# Patient Record
Sex: FEMALE | Race: WHITE | NOT HISPANIC OR LATINO | Employment: PART TIME | URBAN - METROPOLITAN AREA
[De-identification: names, ages, dates, MRNs, and addresses within clinical notes are randomized per-mention and may not be internally consistent; named-entity substitution may affect disease eponyms.]

---

## 2024-09-24 ENCOUNTER — HOSPITAL ENCOUNTER (OUTPATIENT)
Dept: RADIOLOGY | Facility: CLINIC | Age: 69
Discharge: HOME | End: 2024-09-24
Payer: COMMERCIAL

## 2024-09-24 DIAGNOSIS — Z87.891 PERSONAL HISTORY OF NICOTINE DEPENDENCE: ICD-10-CM

## 2024-09-24 DIAGNOSIS — Z12.2 ENCOUNTER FOR SCREENING FOR MALIGNANT NEOPLASM OF RESPIRATORY ORGANS: ICD-10-CM

## 2024-09-24 PROCEDURE — 71271 CT THORAX LUNG CANCER SCR C-: CPT | Performed by: RADIOLOGY

## 2024-09-24 PROCEDURE — 71271 CT THORAX LUNG CANCER SCR C-: CPT

## 2024-10-08 ENCOUNTER — HOSPITAL ENCOUNTER (OUTPATIENT)
Dept: RADIOLOGY | Facility: HOSPITAL | Age: 69
Discharge: HOME | End: 2024-10-08
Payer: COMMERCIAL

## 2024-10-08 ENCOUNTER — HOSPITAL ENCOUNTER (OUTPATIENT)
Dept: RADIOLOGY | Facility: CLINIC | Age: 69
Discharge: HOME | End: 2024-10-08
Payer: COMMERCIAL

## 2024-10-08 ENCOUNTER — HOSPITAL ENCOUNTER (OUTPATIENT)
Dept: RADIOLOGY | Facility: CLINIC | Age: 69
End: 2024-10-08
Payer: COMMERCIAL

## 2024-10-08 VITALS — BODY MASS INDEX: 40.48 KG/M2 | HEIGHT: 62 IN | WEIGHT: 220 LBS

## 2024-10-08 DIAGNOSIS — R92.8 ABNORMAL FINDINGS ON DIAGNOSTIC IMAGING OF BREAST: Primary | ICD-10-CM

## 2024-10-08 DIAGNOSIS — N63.10 UNSPECIFIED LUMP IN THE RIGHT BREAST, UNSPECIFIED QUADRANT: ICD-10-CM

## 2024-10-08 DIAGNOSIS — N63.15 UNSPECIFIED LUMP IN THE RIGHT BREAST, OVERLAPPING QUADRANTS: ICD-10-CM

## 2024-10-08 DIAGNOSIS — N63.10 BREAST MASS, RIGHT: ICD-10-CM

## 2024-10-08 DIAGNOSIS — Z78.0 ASYMPTOMATIC MENOPAUSAL STATE: ICD-10-CM

## 2024-10-08 PROCEDURE — 77080 DXA BONE DENSITY AXIAL: CPT

## 2024-10-08 PROCEDURE — 76642 ULTRASOUND BREAST LIMITED: CPT | Performed by: RADIOLOGY

## 2024-10-08 PROCEDURE — 76642 ULTRASOUND BREAST LIMITED: CPT | Mod: RT

## 2024-10-08 PROCEDURE — 77066 DX MAMMO INCL CAD BI: CPT

## 2024-10-08 PROCEDURE — 76982 USE 1ST TARGET LESION: CPT | Mod: RT

## 2024-10-08 PROCEDURE — 77066 DX MAMMO INCL CAD BI: CPT | Performed by: RADIOLOGY

## 2024-10-08 PROCEDURE — G0279 TOMOSYNTHESIS, MAMMO: HCPCS | Performed by: RADIOLOGY

## 2024-10-08 NOTE — NURSING NOTE
After patient review of diagnostic results with Dr. De, support provided. Written literature regarding abnormal breast imaging and breast biopsy including what to expect before, during, and after the procedure reviewed with the patient. All questions answered. Patient has a consultation with Dr. Felix for 10/16, care coordinated with Dr. Felix's office to move consultation to 10/15 10:45AM to allow for biopsy visit 10/16 11:30AM. Information provided and reviewed to include provider information, directions, and how to reach me directly with questions or concerns before concluding visit.

## 2024-10-08 NOTE — PROGRESS NOTES
Patient follow up scheduling:  right breast ultrasound biopsy per provider recommendation, 10/16 11:30AM per patient scheduling preference (patient was agreeable to  adjust consultation visit to [facility] office for 10/15, though patient had already planned for apt 10/16 and able to accommodate her request for biopsy visit at the original time).

## 2024-10-15 ENCOUNTER — APPOINTMENT (OUTPATIENT)
Facility: CLINIC | Age: 69
End: 2024-10-15
Payer: COMMERCIAL

## 2024-10-15 VITALS
HEIGHT: 62 IN | SYSTOLIC BLOOD PRESSURE: 148 MMHG | DIASTOLIC BLOOD PRESSURE: 104 MMHG | BODY MASS INDEX: 41.7 KG/M2 | WEIGHT: 226.6 LBS | OXYGEN SATURATION: 97 % | HEART RATE: 69 BPM

## 2024-10-15 DIAGNOSIS — N63.11 MASS OF UPPER OUTER QUADRANT OF RIGHT BREAST: Primary | ICD-10-CM

## 2024-10-15 PROCEDURE — 1159F MED LIST DOCD IN RCRD: CPT | Performed by: SURGERY

## 2024-10-15 PROCEDURE — 1160F RVW MEDS BY RX/DR IN RCRD: CPT | Performed by: SURGERY

## 2024-10-15 PROCEDURE — 99203 OFFICE O/P NEW LOW 30 MIN: CPT | Performed by: SURGERY

## 2024-10-15 RX ORDER — BUPROPION HYDROCHLORIDE 150 MG/1
1 TABLET ORAL EVERY MORNING
COMMUNITY
Start: 2024-09-03

## 2024-10-15 RX ORDER — MELOXICAM 7.5 MG/1
7.5 TABLET ORAL
COMMUNITY
Start: 2022-11-04

## 2024-10-15 RX ORDER — ATORVASTATIN CALCIUM 20 MG/1
1 TABLET, FILM COATED ORAL DAILY
COMMUNITY
Start: 2022-11-04

## 2024-10-15 ASSESSMENT — ENCOUNTER SYMPTOMS
DIARRHEA: 0
FATIGUE: 0
CONSTIPATION: 0
FLANK PAIN: 0
VOMITING: 0
EYE PAIN: 0
HEMATURIA: 0
HEADACHES: 0
POLYPHAGIA: 0
CONFUSION: 0
ABDOMINAL PAIN: 0
WOUND: 0
CHILLS: 0
BRUISES/BLEEDS EASILY: 0
SPEECH DIFFICULTY: 0
SHORTNESS OF BREATH: 0
DYSURIA: 0
WEAKNESS: 0
ARTHRALGIAS: 1
EYE REDNESS: 0
FEVER: 0
COUGH: 1
FREQUENCY: 0
AGITATION: 0
MYALGIAS: 0
NAUSEA: 0

## 2024-10-15 NOTE — PATIENT INSTRUCTIONS
1.  Keep your appointment for your ultrasound-guided biopsy of your right breast mass on Wednesday, 10/16/2024.  This biopsy is performed in the radiology department of [facility].  2.  If you have any problems with the biopsy site (bleeding or concern for infection), please contact Dr. Felix's office.  [phone]  3.  Follow-up in Dr. Felix's office on Wednesday, 10/23/2024 with a virtual appointment to review your biopsy results.

## 2024-10-15 NOTE — LETTER
"October 15, 2024     Aurora Delgadillo MD  [address]  [address]  [facility] [address]    Patient: Vira Gordon   YOB: 1955   Date of Visit: 10/15/2024       Dear Dr. Aurora Delgadillo MD:    Thank you for referring Vira Gordon to me for evaluation. Below are my notes for this consultation.  If you have questions, please do not hesitate to call me. I look forward to following your patient along with you.       Sincerely,     Richa Felix MD      CC: No Recipients  ______________________________________________________________________________________        GENERAL SURGERY OFFICE NOTE    Patient: Vira Gordon    Age: 69 y.o.   Gender: female    MRN: [id number]    PCP: Aurora Delgadillo MD        SUBJECTIVE     Chief Complaint  New Patient Visit (Patient was referred by Dr. Delgadillo for abnormal right breast mammogram. Patient states that she has an indent on her right breast that she noticed about 1 month ago. Patient states that the only time she feels pain to the breast is when her cat stands on her breast. )       JAVAD Constantino is a 69-year-old white female who I am seeing in consultation at the request of her primary care physician for evaluation of a palpable right breast mass.  Her last mammogram was more than 10 years ago.  She stopped getting mammograms due to lack of insurance.  She has a significant tobacco history, and underwent a CT evaluation for lung cancer screening.  The CT identified a 2.5 x 2.3 cm right breast mass concerning for malignancy.  She subsequently underwent a diagnostic mammogram and ultrasound which showed a 3.3 cm mass at the 12 o'clock position, 3 cm from the nipple.  A small (5 mm) lymph node was also seen within the breast tissue.  Axilla reportedly was within normal limit.  She has noticed a \"dimple\" on her right breast just above the nipple area over the last month.  No redness of the skin.  An ultrasound-guided biopsy of the mass was " recommended.    Risk factors for breast cancer: 69-year-old white female; menarche at age 14; first live birth at age 20; no previous breast biopsy; no family history of breast cancer.  No family history of ovarian/pancreatic/prostate cancer.  She has a sister who had tobacco related lung cancer.  She went through menopause around her mid 50s, never took any hormone replacement therapy.  This gives her a 5-year Antonia score of 1.2% and a lifetime risk of 4.2% which puts her in a less than average risk category.    ROS  Review of Systems   Constitutional:  Negative for chills, fatigue and fever.   HENT:  Negative for congestion, ear pain and hearing loss.    Eyes:  Negative for pain and redness.   Respiratory:  Positive for cough. Negative for shortness of breath.    Cardiovascular:  Negative for chest pain and leg swelling.   Gastrointestinal:  Negative for abdominal pain, constipation, diarrhea, nausea and vomiting.   Endocrine: Negative for polyphagia.   Genitourinary:  Negative for dysuria, flank pain, frequency and hematuria.   Musculoskeletal:  Positive for arthralgias. Negative for myalgias.   Skin:  Negative for rash and wound.   Allergic/Immunologic: Negative for immunocompromised state.   Neurological:  Negative for speech difficulty, weakness and headaches.   Hematological:  Does not bruise/bleed easily.   Psychiatric/Behavioral:  Negative for agitation and confusion.           HISTORY   History reviewed. No pertinent past medical history.     Past Surgical History:   Procedure Laterality Date   • HYSTERECTOMY     • TOTAL KNEE ARTHROPLASTY Right 01/2023        Family History   Problem Relation Name Age of Onset   • Hypertension Mother     • Diabetes Mother     • Hypertension Father     • Lung cancer Sister          Allergies   Allergen Reactions   • Darvocet A500 [Propoxyphene N-Acetaminophen] Other        Social History     Tobacco Use   Smoking Status Every Day   • Types: Cigarettes   Smokeless Tobacco  "Never        Social History     Substance and Sexual Activity   Alcohol Use Yes    Comment: ocassionally        HOME MEDICATIONS  Current Outpatient Medications   Medication Instructions   • atorvastatin (Lipitor) 20 mg tablet 1 tablet, oral, Daily   • buPROPion XL (Wellbutrin XL) 150 mg 24 hr tablet 1 tablet, oral, Every morning   • meloxicam (MOBIC) 7.5 mg          OBJECTIVE   Last Recorded Vitals.  Blood pressure (!) 148/104, pulse 69, height 1.575 m (5' 2\"), weight 103 kg (226 lb 9.6 oz), SpO2 97%.     PHYSICAL EXAM  Physical Exam   General: Well-developed, well-nourished and in no acute distress.  Head: Normocephalic. Atraumatic.  Neck/thyroid: Neck is supple.   Eyes: Pupils equal round and reactive to light. Conjunctiva normal.  ENMT: No masses or deformity of external nose. External ears without masses.  Respiratory/Chest:  Normal respiratory effort.  Breast: Moderate to large, symmetrical breasts.  No palpable abnormality of the left breast.  On the right breast, just above the areola, there is a 3 x 3 cm palpable mass.  There is a dimple in this gain, but no erythema and no obvious skin lesions.  No expressible nipple drainage.  Lymphatics: No palpable lymphadenopathy of the cervical, supraclavicular or axillary regions.  Cardiovascular: Regular rate and rhythm.   Abdomen: Soft, nontender, nondistended.   Musculoskeletal: Joints and limbs are grossly normal. Normal gait. Normal range of motion of major joints.  Neuro: Oriented to person, place and time. No obvious neurological deficit. Motor strength grossly normal.  Psych: Normal mood and affect.    RESULTS   Labs  No results found for this or any previous visit (from the past 24 hour(s)).    Radiology Resutls  mammo bilateral diagnostic tomosynthesis  BI US breast limited right  Status: Final result     PACS Images     Show images for BI mammo bilateral diagnostic tomosynthesis  Signed by    Signed Time Phone Pager   Edwin De MD 10/08/2024 12:52 " [phone]     Exam Information    Status Exam Begun Exam Ended   Final 10/08/2024 11:18 10/08/2024 11:45     Study Result    Narrative & Impression   Interpreted By:  Edwin De,   STUDY:  BI MAMMO BILATERAL DIAGNOSTIC TOMOSYNTHESIS; BI US BREAST LIMITED  RIGHT;  10/8/2024 11:45 am; 10/8/2024 12:10 pm      ACCESSION NUMBER(S):  [id number]; [id number]      ORDERING CLINICIAN:  MURTAZA GERBER      INDICATION:  Palpable abnormality right breast      ,N63.10 Unspecified lump in the right breast, unspecified  quadrant,N63.15 Unspecified lump in the right breast, overlapping  quadrants      COMPARISON:  No prior examination available for comparison. If a prior examination  becomes available, this examination will be compared to the prior  study. Addendum report will be issued.      FINDINGS:  MAMMOGRAPHY: 2D and tomosynthesis images were reviewed at 1 mm slice  thickness.      Density:  The breasts are almost entirely fatty.      Right breast: 3.3 cm spiculated mass identified of the 12 o'clock  middle depth worrisome for malignancy. Prominent albeit small lymph  node of the posterior depth right breast MLO projection.      Left breast: Normal fibroglandular tissue. No findings worrisome for  malignancy.      ULTRASOUND: Targeted ultrasound was performed of the right breast by  a registered sonographer with elastography.      Right breast 12 o'clock position 3 cm from nipple demonstrates a  lobulated mass with posterior acoustic shadowing measuring up to 3.1  cm in greatest dimension. Elastography stiff.      Right axilla demonstrates a normal lymph node. The 5 mm lymph node of  the posterior depth right breast only seen on MLO projection  mammography is not sonographically apparent on this examination.      IMPRESSION:  Large right breast mass favoring malignancy.      Left breast is normal.      BI-RADS CATEGORY:  BI-RADS CATEGORY:  5 Highly Suggestive of Malignancy.  Recommendation:  Surgical  Consultation and Biopsy.  Recommended Date:  Immediate.  Laterality:  Right.      Ultrasound-guided biopsy right breast recommended. Surgical  consultation with history and physical required prior to biopsy.         ASSESSMENT / PLAN   Diagnoses and all orders for this visit:  Mass of upper outer quadrant of right breast  Comments:  12 o'clock; 3cm from nipple (palpable)      Plan  Oct 2024: RIGHT: palp 3.3cm mass at 12 o'clock, 3cm from nipple    1.  Reviewed with the patient that her radiographic and physical exam findings are very concerning for right breast cancer.  She is currently scheduled for an ultrasound-guided biopsy of the right breast mass tomorrow, 10/16/2024.  The process of the biopsy was reviewed with the patient.  She is on meloxicam chronically, and continues to take this.  Discussed that she does not need to stop this medication prior to the biopsy, but this will make her an increased risk of bleeding after the procedure.  Extended period of direct pressure application after the biopsy is recommended to decrease her risk of bleeding.  2.  She will return via virtual visit on 10/23/2024 review the biopsy results.  3.  Reviewed my high concern for breast cancer.  Given the larger size of the mass, would plan for a breast MRI if the biopsy confirms breast cancer to check for overlying skin involvement, and to check for.  4.  Reviewed that breast cancer treatment can involve surgery, chemotherapy, radiation treatment and even endocrine therapy depending on test results.  Education material was provided for the patient.    Richa Felix MD, FACS  [facility] General Surgery  [address] [place] [address];   [address]  [place]  [address] [phone]

## 2024-10-15 NOTE — PROGRESS NOTES
"    GENERAL SURGERY OFFICE NOTE    Patient: Vira Gordon    Age: 69 y.o.   Gender: female    MRN: [id number]    PCP: Aurora Delgadillo MD        SUBJECTIVE     Chief Complaint  New Patient Visit (Patient was referred by Dr. Delgadillo for abnormal right breast mammogram. Patient states that she has an indent on her right breast that she noticed about 1 month ago. Patient states that the only time she feels pain to the breast is when her cat stands on her breast. )       JAVAD Constantino is a 69-year-old white female who I am seeing in consultation at the request of her primary care physician for evaluation of a palpable right breast mass.  Her last mammogram was more than 10 years ago.  She stopped getting mammograms due to lack of insurance.  She has a significant tobacco history, and underwent a CT evaluation for lung cancer screening.  The CT identified a 2.5 x 2.3 cm right breast mass concerning for malignancy.  She subsequently underwent a diagnostic mammogram and ultrasound which showed a 3.3 cm mass at the 12 o'clock position, 3 cm from the nipple.  A small (5 mm) lymph node was also seen within the breast tissue.  Axilla reportedly was within normal limit.  She has noticed a \"dimple\" on her right breast just above the nipple area over the last month.  No redness of the skin.  An ultrasound-guided biopsy of the mass was recommended.    Risk factors for breast cancer: 69-year-old white female; menarche at age 14; first live birth at age 20; no previous breast biopsy; no family history of breast cancer.  No family history of ovarian/pancreatic/prostate cancer.  She has a sister who had tobacco related lung cancer.  She went through menopause around her mid 50s, never took any hormone replacement therapy.  This gives her a 5-year Antonia score of 1.2% and a lifetime risk of 4.2% which puts her in a less than average risk category.    ROS  Review of Systems   Constitutional:  Negative for chills, fatigue and fever.   HENT: " " Negative for congestion, ear pain and hearing loss.    Eyes:  Negative for pain and redness.   Respiratory:  Positive for cough. Negative for shortness of breath.    Cardiovascular:  Negative for chest pain and leg swelling.   Gastrointestinal:  Negative for abdominal pain, constipation, diarrhea, nausea and vomiting.   Endocrine: Negative for polyphagia.   Genitourinary:  Negative for dysuria, flank pain, frequency and hematuria.   Musculoskeletal:  Positive for arthralgias. Negative for myalgias.   Skin:  Negative for rash and wound.   Allergic/Immunologic: Negative for immunocompromised state.   Neurological:  Negative for speech difficulty, weakness and headaches.   Hematological:  Does not bruise/bleed easily.   Psychiatric/Behavioral:  Negative for agitation and confusion.           HISTORY   History reviewed. No pertinent past medical history.     Past Surgical History:   Procedure Laterality Date    HYSTERECTOMY      TOTAL KNEE ARTHROPLASTY Right 01/2023        Family History   Problem Relation Name Age of Onset    Hypertension Mother      Diabetes Mother      Hypertension Father      Lung cancer Sister          Allergies   Allergen Reactions    Darvocet A500 [Propoxyphene N-Acetaminophen] Other        Social History     Tobacco Use   Smoking Status Every Day    Types: Cigarettes   Smokeless Tobacco Never        Social History     Substance and Sexual Activity   Alcohol Use Yes    Comment: ocassionally        HOME MEDICATIONS  Current Outpatient Medications   Medication Instructions    atorvastatin (Lipitor) 20 mg tablet 1 tablet, oral, Daily    buPROPion XL (Wellbutrin XL) 150 mg 24 hr tablet 1 tablet, oral, Every morning    meloxicam (MOBIC) 7.5 mg          OBJECTIVE   Last Recorded Vitals.  Blood pressure (!) 148/104, pulse 69, height 1.575 m (5' 2\"), weight 103 kg (226 lb 9.6 oz), SpO2 97%.     PHYSICAL EXAM  Physical Exam   General: Well-developed, well-nourished and in no acute distress.  Head: " Normocephalic. Atraumatic.  Neck/thyroid: Neck is supple.   Eyes: Pupils equal round and reactive to light. Conjunctiva normal.  ENMT: No masses or deformity of external nose. External ears without masses.  Respiratory/Chest:  Normal respiratory effort.  Breast: Moderate to large, symmetrical breasts.  No palpable abnormality of the left breast.  On the right breast, just above the areola, there is a 3 x 3 cm palpable mass.  There is a dimple in this gain, but no erythema and no obvious skin lesions.  No expressible nipple drainage.  Lymphatics: No palpable lymphadenopathy of the cervical, supraclavicular or axillary regions.  Cardiovascular: Regular rate and rhythm.   Abdomen: Soft, nontender, nondistended.   Musculoskeletal: Joints and limbs are grossly normal. Normal gait. Normal range of motion of major joints.  Neuro: Oriented to person, place and time. No obvious neurological deficit. Motor strength grossly normal.  Psych: Normal mood and affect.    RESULTS   Labs  No results found for this or any previous visit (from the past 24 hour(s)).    Radiology Resutls  mammo bilateral diagnostic tomosynthesis  BI US breast limited right  Status: Final result     PACS Images     Show images for BI mammo bilateral diagnostic tomosynthesis  Signed by    Signed Time Phone Pager   Edwin De MD 10/08/2024 12:52 [phone]     Exam Information    Status Exam Begun Exam Ended   Final 10/08/2024 11:18 10/08/2024 11:45     Study Result    Narrative & Impression   Interpreted By:  Edwin De,   STUDY:  BI MAMMO BILATERAL DIAGNOSTIC TOMOSYNTHESIS; BI US BREAST LIMITED  RIGHT;  10/8/2024 11:45 am; 10/8/2024 12:10 pm      ACCESSION NUMBER(S):  [id number]; [id number]      ORDERING CLINICIAN:  MURTAZA GERBER      INDICATION:  Palpable abnormality right breast      ,N63.10 Unspecified lump in the right breast, unspecified  quadrant,N63.15 Unspecified lump in the right breast, overlapping  quadrants       COMPARISON:  No prior examination available for comparison. If a prior examination  becomes available, this examination will be compared to the prior  study. Addendum report will be issued.      FINDINGS:  MAMMOGRAPHY: 2D and tomosynthesis images were reviewed at 1 mm slice  thickness.      Density:  The breasts are almost entirely fatty.      Right breast: 3.3 cm spiculated mass identified of the 12 o'clock  middle depth worrisome for malignancy. Prominent albeit small lymph  node of the posterior depth right breast MLO projection.      Left breast: Normal fibroglandular tissue. No findings worrisome for  malignancy.      ULTRASOUND: Targeted ultrasound was performed of the right breast by  a registered sonographer with elastography.      Right breast 12 o'clock position 3 cm from nipple demonstrates a  lobulated mass with posterior acoustic shadowing measuring up to 3.1  cm in greatest dimension. Elastography stiff.      Right axilla demonstrates a normal lymph node. The 5 mm lymph node of  the posterior depth right breast only seen on MLO projection  mammography is not sonographically apparent on this examination.      IMPRESSION:  Large right breast mass favoring malignancy.      Left breast is normal.      BI-RADS CATEGORY:  BI-RADS CATEGORY:  5 Highly Suggestive of Malignancy.  Recommendation:  Surgical Consultation and Biopsy.  Recommended Date:  Immediate.  Laterality:  Right.      Ultrasound-guided biopsy right breast recommended. Surgical  consultation with history and physical required prior to biopsy.         ASSESSMENT / PLAN   Diagnoses and all orders for this visit:  Mass of upper outer quadrant of right breast  Comments:  12 o'clock; 3cm from nipple (palpable)      Plan  Oct 2024: RIGHT: palp 3.3cm mass at 12 o'clock, 3cm from nipple    1.  Reviewed with the patient that her radiographic and physical exam findings are very concerning for right breast cancer.  She is currently scheduled for an  ultrasound-guided biopsy of the right breast mass tomorrow, 10/16/2024.  The process of the biopsy was reviewed with the patient.  She is on meloxicam chronically, and continues to take this.  Discussed that she does not need to stop this medication prior to the biopsy, but this will make her an increased risk of bleeding after the procedure.  Extended period of direct pressure application after the biopsy is recommended to decrease her risk of bleeding.  2.  She will return via virtual visit on 10/23/2024 review the biopsy results.  3.  Reviewed my high concern for breast cancer.  Given the larger size of the mass, would plan for a breast MRI if the biopsy confirms breast cancer to check for overlying skin involvement, and to check for.  4.  Reviewed that breast cancer treatment can involve surgery, chemotherapy, radiation treatment and even endocrine therapy depending on test results.  Education material was provided for the patient.    Richa Felix MD, FACS  [facility] General Surgery  [address] [place] [address];   [address]  [place]  [address] [phone]

## 2024-10-16 ENCOUNTER — APPOINTMENT (OUTPATIENT)
Dept: SURGERY | Facility: CLINIC | Age: 69
End: 2024-10-16
Payer: COMMERCIAL

## 2024-10-16 ENCOUNTER — HOSPITAL ENCOUNTER (OUTPATIENT)
Dept: RADIOLOGY | Facility: HOSPITAL | Age: 69
Discharge: HOME | End: 2024-10-16
Payer: COMMERCIAL

## 2024-10-16 DIAGNOSIS — R92.8 ABNORMAL FINDINGS ON DIAGNOSTIC IMAGING OF BREAST: ICD-10-CM

## 2024-10-16 DIAGNOSIS — N63.10 BREAST MASS, RIGHT: ICD-10-CM

## 2024-10-16 PROCEDURE — 19083 BX BREAST 1ST LESION US IMAG: CPT | Mod: RT

## 2024-10-16 PROCEDURE — 2720000007 HC OR 272 NO HCPCS

## 2024-10-16 PROCEDURE — A4648 IMPLANTABLE TISSUE MARKER: HCPCS

## 2024-10-16 PROCEDURE — 77065 DX MAMMO INCL CAD UNI: CPT | Mod: RT

## 2024-10-16 PROCEDURE — 2780000003 HC OR 278 NO HCPCS

## 2024-10-22 LAB
LAB AP ASR DISCLAIMER: NORMAL
LAB AP BLOCK FOR ADDITIONAL STUDIES: NORMAL
LABORATORY COMMENT REPORT: NORMAL
PATH REPORT.FINAL DX SPEC: NORMAL
PATH REPORT.GROSS SPEC: NORMAL
PATH REPORT.RELEVANT HX SPEC: NORMAL
PATH REPORT.TOTAL CANCER: NORMAL
PATHOLOGY SYNOPTIC REPORT: NORMAL

## 2024-10-23 ENCOUNTER — APPOINTMENT (OUTPATIENT)
Dept: SURGERY | Facility: CLINIC | Age: 69
End: 2024-10-23
Payer: COMMERCIAL

## 2024-10-23 DIAGNOSIS — C50.411 MALIGNANT NEOPLASM OF UPPER-OUTER QUADRANT OF RIGHT BREAST IN FEMALE, ESTROGEN RECEPTOR POSITIVE: Primary | ICD-10-CM

## 2024-10-23 DIAGNOSIS — Z17.0 MALIGNANT NEOPLASM OF UPPER-OUTER QUADRANT OF RIGHT BREAST IN FEMALE, ESTROGEN RECEPTOR POSITIVE: Primary | ICD-10-CM

## 2024-10-23 DIAGNOSIS — N63.11 MASS OF UPPER OUTER QUADRANT OF RIGHT BREAST: ICD-10-CM

## 2024-10-23 PROCEDURE — 1160F RVW MEDS BY RX/DR IN RCRD: CPT | Performed by: SURGERY

## 2024-10-23 PROCEDURE — 1159F MED LIST DOCD IN RCRD: CPT | Performed by: SURGERY

## 2024-10-23 PROCEDURE — 99441 PR PHYS/QHP TELEPHONE EVALUATION 5-10 MIN: CPT | Performed by: SURGERY

## 2024-10-23 RX ORDER — CELECOXIB 100 MG/1
100 CAPSULE ORAL 2 TIMES DAILY
COMMUNITY

## 2024-10-23 ASSESSMENT — ENCOUNTER SYMPTOMS
FEVER: 0
FLANK PAIN: 0
ABDOMINAL PAIN: 0
VOMITING: 0
AGITATION: 0
CONFUSION: 0
DIARRHEA: 0
SPEECH DIFFICULTY: 0
CONSTIPATION: 0
BRUISES/BLEEDS EASILY: 0
FATIGUE: 0
WEAKNESS: 0
HEMATURIA: 0
NAUSEA: 0
CHILLS: 0
POLYPHAGIA: 0
COUGH: 1
MYALGIAS: 0
WOUND: 0
FREQUENCY: 0
EYE REDNESS: 0
DYSURIA: 0
HEADACHES: 0
EYE PAIN: 0
ARTHRALGIAS: 1
SHORTNESS OF BREATH: 0

## 2024-10-23 NOTE — PROGRESS NOTES
GENERAL SURGERY OFFICE NOTE    Patient: Vira Gordon    Age: 69 y.o.   Gender: female    MRN: [id number]    PCP: Aurora Delgadillo MD        SUBJECTIVE     Chief Complaint  Follow-up (Patient telephone appointment for breast biopsy follow up.  Patient states that the site is still bruised and sore. )       HPI  The patient was interviewed via a phone. We spent approximately 8 minutes in the phone communication. The patient gave consent for this type of visit.  I performed this visit using real-time telehealth tools, including a telephone connection between Vira at her home and myself / Dr. Felix at the [facility] office.    Vira returns to the office via phone visit for follow-up after undergoing an ultrasound-guided biopsy of a palpable right breast mass.  She states that she has a bruising about the size of a fist.  There is no bleeding or drainage from the biopsy site.  She complains of itching of both breast, but feels that this is more related to a change of her laundry detergent.  She does not have an overlying rash.  She is here to review biopsy results.    Risk factors for breast cancer: 69-year-old white female; menarche at age 14; first live birth at age 20; no previous breast biopsy; no family history of breast cancer.  No family history of ovarian/pancreatic/prostate cancer.  She has a sister who had tobacco related lung cancer.  She went through menopause around her mid 50s, never took any hormone replacement therapy.  This gives her a 5-year Antonia score of 1.2% and a lifetime risk of 4.2% which puts her in a less than average risk category.    ROS  Review of Systems   Constitutional:  Negative for chills, fatigue and fever.   HENT:  Negative for congestion, ear pain and hearing loss.    Eyes:  Negative for pain and redness.   Respiratory:  Positive for cough. Negative for shortness of breath.    Cardiovascular:  Negative for chest pain and leg swelling.   Gastrointestinal:  Negative for  abdominal pain, constipation, diarrhea, nausea and vomiting.   Endocrine: Negative for polyphagia.   Genitourinary:  Negative for dysuria, flank pain, frequency and hematuria.   Musculoskeletal:  Positive for arthralgias. Negative for myalgias.   Skin:  Negative for rash and wound.   Allergic/Immunologic: Negative for immunocompromised state.   Neurological:  Negative for speech difficulty, weakness and headaches.   Hematological:  Does not bruise/bleed easily.   Psychiatric/Behavioral:  Negative for agitation and confusion.           HISTORY   No past medical history on file.     Past Surgical History:   Procedure Laterality Date    HYSTERECTOMY      TOTAL KNEE ARTHROPLASTY Right 01/2023        Family History   Problem Relation Name Age of Onset    Hypertension Mother      Diabetes Mother      Hypertension Father      Lung cancer Sister          Allergies   Allergen Reactions    Darvocet A500 [Propoxyphene N-Acetaminophen] Other        Social History     Tobacco Use   Smoking Status Every Day    Types: Cigarettes   Smokeless Tobacco Never        Social History     Substance and Sexual Activity   Alcohol Use Yes    Comment: ocassionally        HOME MEDICATIONS  Current Outpatient Medications   Medication Instructions    atorvastatin (Lipitor) 20 mg tablet 1 tablet, oral, Daily    buPROPion XL (Wellbutrin XL) 150 mg 24 hr tablet 1 tablet, oral, Every morning    celecoxib (CELEBREX) 100 mg, oral, 2 times daily    meloxicam (MOBIC) 7.5 mg          OBJECTIVE   Last Recorded Vitals.  There were no vitals taken for this visit.     PHYSICAL EXAM  Physical Exam   Phone visit/previous exam:  General: Well-developed, well-nourished and in no acute distress.  Head: Normocephalic. Atraumatic.  Neck/thyroid: Neck is supple.   Eyes: Pupils equal round and reactive to light. Conjunctiva normal.  ENMT: No masses or deformity of external nose. External ears without masses.  Respiratory/Chest:  Normal respiratory effort.  Breast:  Moderate to large, symmetrical breasts.  No palpable abnormality of the left breast.  On the right breast, just above the areola, there is a 3 x 3 cm palpable mass.  There is a dimple in this gain, but no erythema and no obvious skin lesions.  No expressible nipple drainage.  Lymphatics: No palpable lymphadenopathy of the cervical, supraclavicular or axillary regions.  Cardiovascular: Regular rate and rhythm.   Abdomen: Soft, nontender, nondistended.   Musculoskeletal: Joints and limbs are grossly normal. Normal gait. Normal range of motion of major joints.  Neuro: Oriented to person, place and time. No obvious neurological deficit. Motor strength grossly normal.  Psych: Normal mood and affect.    RESULTS   Labs  Surgical Pathology Exam: [id number]  Order: [id number]   Collected 10/16/2024 12:12       Status: Final result       Visible to patient: No (inaccessible in [facility])    0 Result Notes       Component  Resulting Agency   FINAL DIAGNOSIS   A. 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     Note:  In this limited sample, the invasive carcinoma measures up to 7 mm in greatest dimension.  ER, IL and HER2 will be reported in a synoptic report.    Electronically signed by Rachid Lentz MD on 10/22/2024 at [address] [place] [address]   By the signature on this report, the individual or group listed as making the Final Interpretation/Diagnosis certifies that they have reviewed this case.    Case Summary Report   Breast Biomarker Reporting Template   Protocol posted: 12/13/2023BREAST BIOMARKER REPORTING TEMPLATE - A  Test(s) Performed     Estrogen Receptor (ER) Status  Positive (greater than 10% of cells demonstrate nuclear positivity)   Percentage of Cells with Nuclear Positivity  >95 %   Average Intensity of Staining  Strong   Test Type  Food and Drug Administration (FDA) cleared (test / vendor): Roche CONFIRM  anti-(ER) (SP1) Rabbit Monoclonal Primary Antibody   Primary Antibody  SP1   Scoring System  No separate scoring system used   Test(s) Performed     Progesterone Receptor (PgR) Status  Positive   Percentage of Cells with Nuclear Positivity  90 %   Average Intensity of Staining  Strong   Test Type  Food and Drug Administration (FDA) cleared (test / vendor): Roche CONFIRM anti-([place]) (1E2) Rabbit Monoclonal Primary Anitbody   Primary Antibody  1E2   Scoring System  No separate scoring system used   Test(s) Performed     HER2 by Immunohistochemistry  Negative (Score 0)   Test Type  Food and Drug Administration (FDA) cleared (test / vendor): Roche Pathway anti-HER-2/sabrina (4B5) Rabbit Monoclonal Primary Antibody             Radiology Resutls  mammo bilateral diagnostic tomosynthesis  BI US breast limited right  Status: Final result     PACS Images     Show images for BI mammo bilateral diagnostic tomosynthesis  Signed by    Signed Time Phone Pager   Edwin De MD 10/08/2024 12:52 [phone]     Exam Information    Status Exam Begun Exam Ended   Final 10/08/2024 11:18 10/08/2024 11:45     Study Result    Narrative & Impression   Interpreted By:  Edwin De,   STUDY:  BI MAMMO BILATERAL DIAGNOSTIC TOMOSYNTHESIS; BI US BREAST LIMITED  RIGHT;  10/8/2024 11:45 am; 10/8/2024 12:10 pm      ACCESSION NUMBER(S):  [id number]; [id number]      ORDERING CLINICIAN:  MURTAZA GERBER      INDICATION:  Palpable abnormality right breast      ,N63.10 Unspecified lump in the right breast, unspecified  quadrant,N63.15 Unspecified lump in the right breast, overlapping  quadrants      COMPARISON:  No prior examination available for comparison. If a prior examination  becomes available, this examination will be compared to the prior  study. Addendum report will be issued.      FINDINGS:  MAMMOGRAPHY: 2D and tomosynthesis images were reviewed at 1 mm slice  thickness.      Density:  The breasts are almost entirely  fatty.      Right breast: 3.3 cm spiculated mass identified of the 12 o'clock  middle depth worrisome for malignancy. Prominent albeit small lymph  node of the posterior depth right breast MLO projection.      Left breast: Normal fibroglandular tissue. No findings worrisome for  malignancy.      ULTRASOUND: Targeted ultrasound was performed of the right breast by  a registered sonographer with elastography.      Right breast 12 o'clock position 3 cm from nipple demonstrates a  lobulated mass with posterior acoustic shadowing measuring up to 3.1  cm in greatest dimension. Elastography stiff.      Right axilla demonstrates a normal lymph node. The 5 mm lymph node of  the posterior depth right breast only seen on MLO projection  mammography is not sonographically apparent on this examination.      IMPRESSION:  Large right breast mass favoring malignancy.      Left breast is normal.      BI-RADS CATEGORY:  BI-RADS CATEGORY:  5 Highly Suggestive of Malignancy.  Recommendation:  Surgical Consultation and Biopsy.  Recommended Date:  Immediate.  Laterality:  Right.      Ultrasound-guided biopsy right breast recommended. Surgical  consultation with history and physical required prior to biopsy.         ASSESSMENT / PLAN   Diagnoses and all orders for this visit:  Malignant neoplasm of upper-outer quadrant of right breast in female, estrogen receptor positive  -     MR breast bilateral w contrast full protocol; Future  Mass of upper outer quadrant of right breast        Plan  Oct 2024: RIGHT: palp 3.3cm mass at 12 o'clock, 3cm from nipple; IDC; gr2; >95/90/neg    1.  Pathology of the core needle biopsy of the palpable right breast mass confirmed invasive ductal carcinoma.  The size of the mass is approximately 3.3 cm by radiographic evaluation which would make this a T2 lesion.  There is some concern for possible overlying skin involvement as there is some dimpling in the skin just above the areolar region.  Will get in MRI  of the breast to further evaluate the extent of the cancer in the breast tissue, as well as evaluation of the right axillary lymph nodes.  2.  If the lesion is not significantly larger, does not involve the skin and or axillary lymph nodes appear normal, can proceed with surgical intervention.  She is leaning towards breast conservation surgery which would include a lumpectomy with sentinel lymph node biopsy.  She understands that this treatment also involves radiation treatment and endocrine therapy postoperatively.  She will follow-up in the office after the breast MRI to review the results and plan for surgical intervention if the axilla remains clinically negative.  3.  Her cancer is ER/TN positive.  Will plan for referral to medical oncology postoperatively for endocrine therapy.  4.  If she does decide to proceed with breast conservation surgery, we will plan for referral to radiation oncology postoperatively for adjuvant XRT.  5.  She does not have any family history of breast cancer; therefore, does not meet criteria for referral to genetics.  6.  Anticipate surgery in early December 2024.  Would have her hold the meloxicam or Celebrex for 1 week preoperatively if possible to reduce her risk of perioperative bleeding.    Richa Felix MD, FACS  [facility] General Surgery  [address] [place] [address];   [facility] [address]  [place]  [address] [phone]

## 2024-10-23 NOTE — PATIENT INSTRUCTIONS
1.  The needle biopsy of your right breast mass does confirm breast cancer.  To better understand the extent of the cancer in the breast, and to evaluate if there are any abnormal lymph nodes in your right armpit, a breast MRI will be scheduled.  2.  Return to Dr. Felix's office after the breast MRI to review results and determine if surgery is the next step in your treatment plan.

## 2024-10-28 ENCOUNTER — TELEPHONE (OUTPATIENT)
Dept: RADIOLOGY | Facility: HOSPITAL | Age: 69
End: 2024-10-28
Payer: COMMERCIAL

## 2024-10-31 ENCOUNTER — HOSPITAL ENCOUNTER (OUTPATIENT)
Dept: RADIOLOGY | Facility: HOSPITAL | Age: 69
Discharge: HOME | End: 2024-10-31
Payer: COMMERCIAL

## 2024-10-31 DIAGNOSIS — C50.411 MALIGNANT NEOPLASM OF UPPER-OUTER QUADRANT OF RIGHT BREAST IN FEMALE, ESTROGEN RECEPTOR POSITIVE: ICD-10-CM

## 2024-10-31 DIAGNOSIS — Z17.0 MALIGNANT NEOPLASM OF UPPER-OUTER QUADRANT OF RIGHT BREAST IN FEMALE, ESTROGEN RECEPTOR POSITIVE: ICD-10-CM

## 2024-10-31 PROCEDURE — A9575 INJ GADOTERATE MEGLUMI 0.1ML: HCPCS | Performed by: SURGERY

## 2024-10-31 PROCEDURE — 77049 MRI BREAST C-+ W/CAD BI: CPT

## 2024-10-31 PROCEDURE — 2550000001 HC RX 255 CONTRASTS: Performed by: SURGERY

## 2024-10-31 RX ORDER — GADOTERATE MEGLUMINE 376.9 MG/ML
0.2 INJECTION INTRAVENOUS
Status: COMPLETED | OUTPATIENT
Start: 2024-10-31 | End: 2024-10-31

## 2024-11-01 ENCOUNTER — APPOINTMENT (OUTPATIENT)
Dept: SURGERY | Facility: CLINIC | Age: 69
End: 2024-11-01
Payer: COMMERCIAL

## 2024-11-06 ENCOUNTER — APPOINTMENT (OUTPATIENT)
Dept: SURGERY | Facility: CLINIC | Age: 69
End: 2024-11-06
Payer: COMMERCIAL

## 2024-11-06 VITALS
DIASTOLIC BLOOD PRESSURE: 84 MMHG | HEART RATE: 75 BPM | HEIGHT: 62 IN | BODY MASS INDEX: 42.65 KG/M2 | WEIGHT: 231.8 LBS | SYSTOLIC BLOOD PRESSURE: 134 MMHG | OXYGEN SATURATION: 94 %

## 2024-11-06 DIAGNOSIS — N63.11 MASS OF UPPER OUTER QUADRANT OF RIGHT BREAST: ICD-10-CM

## 2024-11-06 DIAGNOSIS — Z17.0 MALIGNANT NEOPLASM OF UPPER-OUTER QUADRANT OF RIGHT BREAST IN FEMALE, ESTROGEN RECEPTOR POSITIVE: Primary | ICD-10-CM

## 2024-11-06 DIAGNOSIS — C50.411 MALIGNANT NEOPLASM OF UPPER-OUTER QUADRANT OF RIGHT BREAST IN FEMALE, ESTROGEN RECEPTOR POSITIVE: Primary | ICD-10-CM

## 2024-11-06 ASSESSMENT — ENCOUNTER SYMPTOMS
POLYPHAGIA: 0
SHORTNESS OF BREATH: 0
HEADACHES: 0
CONFUSION: 0
EYE REDNESS: 0
CONSTIPATION: 0
VOMITING: 0
DYSURIA: 0
NAUSEA: 0
MYALGIAS: 0
FEVER: 0
FREQUENCY: 0
WOUND: 0
COUGH: 1
EYE PAIN: 0
WEAKNESS: 0
SPEECH DIFFICULTY: 0
FLANK PAIN: 0
HEMATURIA: 0
DIARRHEA: 0
CHILLS: 0
ARTHRALGIAS: 1
AGITATION: 0
BRUISES/BLEEDS EASILY: 0
ABDOMINAL PAIN: 0
FATIGUE: 0

## 2024-11-06 NOTE — LETTER
November 6, 2024     Patient: Vira Gordon   YOB: 1955   Date of Visit: 11/6/2024       To Whom It May Concern:    Vira Gordon was seen in my office today, 11/6/2024.  She is scheduled for surgery on 12/9/2024.  Due to activity restrictions postoperatively, and other associated treatments with her diagnosis, she will be out of work for approximately 8 weeks.  She will be evaluated intermittently during this postoperative phase to determine when she will be ready to return back to work.    If you have any questions, please contact our office at [phone].        Sincerely,    Richa Felix MD  [address] [place] [address]  [address] [phone]

## 2024-11-06 NOTE — PROGRESS NOTES
GENERAL SURGERY OFFICE NOTE    Patient: Vira Gordon    Age: 69 y.o.   Gender: female    MRN: [id number]    PCP: Aurora Delgadillo MD        SUBJECTIVE     Chief Complaint  Follow-up (Patient is here for a follow up breast MRI. Patient states no new or worsening symptoms with the right breast. )       JAVAD Constantino returns to the office for follow-up of her right breast cancer.  She had undergone a breast MRI since her last office appointment when she was diagnosed with hormone sensitive invasive ductal carcinoma.  She is here to review MRI results and discuss surgical intervention.  The MRI identified the mass at 3.1 cm with overlying skin dimpling, but no obvious skin involvement of the cancer.  Radiographically, all lymph nodes are negative.    Risk factors for breast cancer: 69-year-old white female; menarche at age 14; first live birth at age 20; no previous breast biopsy; no family history of breast cancer.  No family history of ovarian/pancreatic/prostate cancer.  She has a sister who had tobacco related lung cancer.  She went through menopause around her mid 50s, never took any hormone replacement therapy.  This gives her a 5-year Antonia score of 1.2% and a lifetime risk of 4.2% which puts her in a less than average risk category.    ROS  Review of Systems   Constitutional:  Negative for chills, fatigue and fever.   HENT:  Negative for congestion, ear pain and hearing loss.    Eyes:  Negative for pain and redness.   Respiratory:  Positive for cough. Negative for shortness of breath.    Cardiovascular:  Negative for chest pain and leg swelling.   Gastrointestinal:  Negative for abdominal pain, constipation, diarrhea, nausea and vomiting.   Endocrine: Negative for polyphagia.   Genitourinary:  Negative for dysuria, flank pain, frequency and hematuria.   Musculoskeletal:  Positive for arthralgias. Negative for myalgias.   Skin:  Negative for rash and wound.   Allergic/Immunologic: Negative for  "immunocompromised state.   Neurological:  Negative for speech difficulty, weakness and headaches.   Hematological:  Does not bruise/bleed easily.   Psychiatric/Behavioral:  Negative for agitation and confusion.           HISTORY   History reviewed. No pertinent past medical history.     Past Surgical History:   Procedure Laterality Date    HYSTERECTOMY      TOTAL KNEE ARTHROPLASTY Right 01/2023        Family History   Problem Relation Name Age of Onset    Hypertension Mother      Diabetes Mother      Hypertension Father      Lung cancer Sister          Allergies   Allergen Reactions    Darvocet A500 [Propoxyphene N-Acetaminophen] Other        Social History     Tobacco Use   Smoking Status Every Day    Types: Cigarettes   Smokeless Tobacco Never        Social History     Substance and Sexual Activity   Alcohol Use Yes    Comment: ocassionally        HOME MEDICATIONS  Current Outpatient Medications   Medication Instructions    atorvastatin (Lipitor) 20 mg tablet 1 tablet, oral, Daily    buPROPion XL (Wellbutrin XL) 150 mg 24 hr tablet 1 tablet, oral, Every morning    celecoxib (CELEBREX) 100 mg, oral, 2 times daily    meloxicam (MOBIC) 7.5 mg          OBJECTIVE   Last Recorded Vitals.  Blood pressure 134/84, pulse 75, height 1.575 m (5' 2\"), weight 105 kg (231 lb 12.8 oz), SpO2 94%.     PHYSICAL EXAM  Physical Exam   Phone visit/previous exam:  General: Well-developed, well-nourished and in no acute distress.  Head: Normocephalic. Atraumatic.  Neck/thyroid: Neck is supple.   Eyes: Pupils equal round and reactive to light. Conjunctiva normal.  ENMT: No masses or deformity of external nose. External ears without masses.  Respiratory/Chest:  Normal respiratory effort.  Breast: Moderate to large, symmetrical breasts.  No palpable abnormality of the left breast.  On the right breast, just above the areola, there is a 3 x 3 cm palpable mass.  There is a dimple in this skin, but no erythema and no obvious skin lesions.  No " expressible nipple drainage.  Lymphatics: No palpable lymphadenopathy of the cervical, supraclavicular or axillary regions.  Cardiovascular: Regular rate and rhythm.   Abdomen: Soft, nontender, nondistended.   Musculoskeletal: Joints and limbs are grossly normal. Normal gait. Normal range of motion of major joints.  Neuro: Oriented to person, place and time. No obvious neurological deficit. Motor strength grossly normal.  Psych: Normal mood and affect.    RESULTS   Labs  Surgical Pathology Exam: [id number]  Order: [id number]   Collected 10/16/2024 12:12       Status: Final result       Visible [facility] patient: No [facility]    0 Result Notes       Component  Resulting Agency   FINAL DIAGNOSIS   A. 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     Note:  In this limited sample, the invasive carcinoma measures up to 7 mm in greatest dimension.  ER, HI and HER2 will be reported in a synoptic report.    Electronically signed by Rachid Lentz MD on 10/22/2024 at [address] [place] [address]   By the signature on this report, the individual or group listed as making the Final Interpretation/Diagnosis certifies that they have reviewed this case.    Case Summary Report   Breast Biomarker Reporting Template   Protocol posted: 12/13/2023BREAST BIOMARKER REPORTING TEMPLATE - A  Test(s) Performed     Estrogen Receptor (ER) Status  Positive (greater than 10% of cells demonstrate nuclear positivity)   Percentage of Cells with Nuclear Positivity  >95 %   Average Intensity of Staining  Strong   Test Type  Food and Drug Administration (FDA) cleared (test / vendor): Roche CONFIRM anti-(ER) (SP1) Rabbit Monoclonal Primary Antibody   Primary Antibody  SP1   Scoring System  No separate scoring system used   Test(s) Performed     Progesterone Receptor (PgR) Status  Positive   Percentage of Cells with Nuclear Positivity  90 %    Average Intensity of Staining  Strong   Test Type  Food and Drug Administration (FDA) cleared (test / vendor): Roche CONFIRM anti-(NJ) (1E2) Rabbit Monoclonal Primary Anitbody   Primary Antibody  1E2   Scoring System  No separate scoring system used   Test(s) Performed     HER2 by Immunohistochemistry  Negative (Score 0)   Test Type  Food and Drug Administration (FDA) cleared (test / vendor): Roche Pathway anti-HER-2/sabrina (4B5) Rabbit Monoclonal Primary Antibody             Radiology Resutls  mammo bilateral diagnostic tomosynthesis  BI US breast limited right  Status: Final result     PACS Images     Show images for BI mammo bilateral diagnostic tomosynthesis  Signed by    Signed Time Phone Pager   Edwin De MD 10/08/2024 12:52 [phone]     Exam Information    Status Exam Begun Exam Ended   Final 10/08/2024 11:18 10/08/2024 11:45     Study Result    Narrative & Impression   Interpreted By:  Edwin De,   STUDY:  BI MAMMO BILATERAL DIAGNOSTIC TOMOSYNTHESIS; BI US BREAST LIMITED  RIGHT;  10/8/2024 11:45 am; 10/8/2024 12:10 pm      ACCESSION NUMBER(S):  [id number]; [id number]      ORDERING CLINICIAN:  MURTAZA GERBER      INDICATION:  Palpable abnormality right breast      ,N63.10 Unspecified lump in the right breast, unspecified  quadrant,N63.15 Unspecified lump in the right breast, overlapping  quadrants      COMPARISON:  No prior examination available for comparison. If a prior examination  becomes available, this examination will be compared to the prior  study. Addendum report will be issued.      FINDINGS:  MAMMOGRAPHY: 2D and tomosynthesis images were reviewed at 1 mm slice  thickness.      Density:  The breasts are almost entirely fatty.      Right breast: 3.3 cm spiculated mass identified of the 12 o'clock  middle depth worrisome for malignancy. Prominent albeit small lymph  node of the posterior depth right breast MLO projection.      Left breast: Normal fibroglandular tissue.  No findings worrisome for  malignancy.      ULTRASOUND: Targeted ultrasound was performed of the right breast by  a registered sonographer with elastography.      Right breast 12 o'clock position 3 cm from nipple demonstrates a  lobulated mass with posterior acoustic shadowing measuring up to 3.1  cm in greatest dimension. Elastography stiff.      Right axilla demonstrates a normal lymph node. The 5 mm lymph node of  the posterior depth right breast only seen on MLO projection  mammography is not sonographically apparent on this examination.      IMPRESSION:  Large right breast mass favoring malignancy.      Left breast is normal.      BI-RADS CATEGORY:  BI-RADS CATEGORY:  5 Highly Suggestive of Malignancy.  Recommendation:  Surgical Consultation and Biopsy.  Recommended Date:  Immediate.  Laterality:  Right.      Ultrasound-guided biopsy right breast recommended. Surgical  consultation with history and physical required prior to biopsy.     MR breast bilateral w contrast full protocol  Status: Final result     PACS Images     Show images for MR breast bilateral w contrast full protocol  Signed by    Signed Time Phone Pager   Em Rosales MD 11/04/2024 09:03 [phone]     Exam Information    Status Exam Begun Exam Ended   Final 10/31/2024 14:24 10/31/2024 15:02     Study Result    Narrative & Impression   Interpreted By:  Em Rosales and Marshall Colin   STUDY:  BI MR BREAST BILATERAL WITH CONTRAST FULL PROTOCOL;  10/31/2024 3:02  pm      ACCESSION NUMBER(S):  [id number]      ORDERING CLINICIAN:  JILLIAN SHEPPARD      INDICATION:  Recently diagnosed invasive ductal carcinoma of the right breast.      ,C50.411 Malignant neoplasm of upper-outer quadrant of right female  breast,Z17.0 Estrogen receptor positive status (ER+)      COMPARISON:  Mammogram and ultrasound dated 10/08/2024 and 10/16/2024.      TECHNIQUE:  Using a dedicated breast coil, STIR axial and T1-weighted fat  saturation axial  images of the breasts were obtained, the latter both  before and after intravenous administration of Gadolinium DTPA. On an  independent workstation, 3-D images were formulated using [employer]  including time enhancement curves, subtraction images and MIP images.      Intravenous contrast: 20 ML of Dotarem      FINDINGS:  Density: Almost entirely fat.      There is symmetric mild bilateral background enhancement.      RIGHT BREAST:  Within the superior central quadrant at anterior to  middle depth, there is an irregular spiculated rim enhancing mass  measuring 3.1 x 2.6 x 2.8 cm (series 502, image 105) with an internal  focus of susceptibility artifact corresponding to the biopsy tissue  marker compatible with patient's biopsy-proven right breast cancer.  There is associated skin retraction without evidence of extent to the  skin surface or chest wall. There is no evidence of nipple  involvement. No additional abnormal enhancing masses or non-mass  enhancement.      No axillary or internal mammary lymphadenopathy is appreciated.      LEFT BREAST:  No suspicious mass or nonmass enhancement is identified.      No axillary or internal mammary lymphadenopathy is appreciated.      NON-BREAST FINDINGS:  None.      IMPRESSION:  1. Biopsy-proven right breast cancer as described above. Surgical  consultation is recommended.  2. No MRI evidence of malignancy in the left breast.  3. No axillary or internal mammary lymphadenopathy.      BI-RADS CATEGORY:  BI-RADS CATEGORY:  6 Known Biopsy-Proven Malignancy.  Recommendation:  Immediate Follow-up.  Recommended Date:  Immediate.  Laterality:  Right.      For any future breast imaging appointments, please call [phone]).      I personally reviewed the images/study and I agree with the breast  imaging fellow, Brendan Rosales D.O., findings as stated. This study  was interpreted at [facility],  [place], [place].      MACRO:  None      Signed  by: Em Verdeall 11/4/2024 9:03 AM  Dictation workstation:   [id number]         ASSESSMENT / PLAN   Diagnoses and all orders for this visit:  Malignant neoplasm of upper-outer quadrant of right breast in female, estrogen receptor positive  -     Case Request Operating Room: Right breast lumpectomy with sentinel lymph node biopsy  -     Referral to Radiation Oncology; Future  -     NM injection only for sentinel node biopsy  no scan performed; Future  Mass of upper outer quadrant of right breast          Plan  Oct 2024: RIGHT: palp 3.3cm mass at 12 o'clock, 3cm from nipple; IDC; gr2; >95/90/neg    1.  Pathology of the core needle biopsy of the palpable right breast mass confirmed invasive ductal carcinoma.  The size of the mass is approximately 3.3 cm by radiographic evaluation which would make this a T2 lesion.  There is some concern for possible overlying skin involvement as there is some dimpling in the skin just above the areolar region.  The MRI confirmed a 3.1 cm area of concern.  There does seem to be some retraction of the overlying skin, but no obvious involvement of cancer within the skin itself.  Radiographically, lymph nodes are negative.  2.  Since clinically her lymph nodes are negative, we will plan to proceed with surgical intervention.  Discussed the benefits and risk of breast conservation surgery versus mastectomy.  She is elected to undergo breast conservation surgery which would include a right breast lumpectomy (partial mastectomy), sentinel lymph node biopsy with postoperative XRT.  Although there is no overlying skin involvement with the cancer, it is very close to the skin surface.  Therefore, we will plan to do an excision of the skin overlying the palpable mass at the time of her surgery as well.  3.  Her cancer is ER/IL positive.  Will plan for referral to medical oncology postoperatively for endocrine therapy.  4.  Refer to radiation oncology for adjuvant XRT.  5.  She does not have  any family history of breast cancer; therefore, does not meet criteria for referral to genetics.  6.  Would have her hold the meloxicam or Celebrex for 1 week preoperatively if possible to reduce her risk of perioperative bleeding.  She would like to wait until after Thanksgiving to have her surgery.  Surgery will be tentatively scheduled for 12/9/2024.    Richa Felix MD, FACS  [facility] General Surgery  [address] [place] [address];   [address]  [place]  [address] [phone]

## 2024-11-06 NOTE — PATIENT INSTRUCTIONS
1.  Surgery for your right breast cancer is scheduled for Monday, 12/9/2024.  Please, read the patient preoperative instruction sheet BEFORE your surgery.

## 2024-11-07 ENCOUNTER — TELEPHONE (OUTPATIENT)
Dept: RADIOLOGY | Facility: HOSPITAL | Age: 69
End: 2024-11-07
Payer: COMMERCIAL

## 2024-11-07 NOTE — TELEPHONE ENCOUNTER
This RN Breast Care Coordinator contacted patient for outreach to identify barriers, needs, and offer support. Patient states understanding preoperative plan with no questions at this time. Patient states her work is able to accommodate providing the week before sx off as she will need to stop her anti-inflammatory pain medication preoperatively. Provided therapeutic listening and emotional support. Patient states she is at work now, has no further needs or concerns. Encouraged patient to call with additional questions, concerns, or support needs before concluding our call.

## 2024-12-04 ENCOUNTER — ANESTHESIA EVENT (OUTPATIENT)
Dept: OPERATING ROOM | Facility: HOSPITAL | Age: 69
End: 2024-12-04
Payer: COMMERCIAL

## 2024-12-05 ENCOUNTER — PREP FOR PROCEDURE (OUTPATIENT)
Dept: SURGERY | Facility: HOSPITAL | Age: 69
End: 2024-12-05
Payer: COMMERCIAL

## 2024-12-08 NOTE — H&P (VIEW-ONLY)
GENERAL SURGERY PREOP H&P     Patient: Vira Gordon    Age: 69 y.o.   Gender: female    MRN: [id number]    PCP: Aurora Delgadillo MD          SUBJECTIVE         JAVAD Constantinois a 69 year old white female who presents for a right breast lumpectomy (partial mastectomy) and SLNB for right breast cancer. She originally presented with a palpable right breast mass. CNB showed IDC.  She had undergone a breast MRI since her last office appointment when she was diagnosed with hormone sensitive invasive ductal carcinoma.  She is here to review MRI results and discuss surgical intervention.  The MRI identified the mass at 3.1 cm with overlying skin dimpling, but no obvious skin involvement of the cancer.  Radiographically, all lymph nodes are negative.     Risk factors for breast cancer: 69-year-old white female; menarche at age 14; first live birth at age 20; no previous breast biopsy; no family history of breast cancer.  No family history of ovarian/pancreatic/prostate cancer.  She has a sister who had tobacco related lung cancer.  She went through menopause around her mid 50s, never took any hormone replacement therapy.  This gives her a 5-year Antonia score of 1.2% and a lifetime risk of 4.2% which puts her in a less than average risk category.     ROS  Review of Systems   Constitutional:  Negative for chills, fatigue and fever.   HENT:  Negative for congestion, ear pain and hearing loss.    Eyes:  Negative for pain and redness.   Respiratory:  Positive for cough. Negative for shortness of breath.    Cardiovascular:  Negative for chest pain and leg swelling.   Gastrointestinal:  Negative for abdominal pain, constipation, diarrhea, nausea and vomiting.   Endocrine: Negative for polyphagia.   Genitourinary:  Negative for dysuria, flank pain, frequency and hematuria.   Musculoskeletal:  Positive for arthralgias. Negative for myalgias.   Skin:  Negative for rash and wound.   Allergic/Immunologic: Negative for immunocompromised  "state.   Neurological:  Negative for speech difficulty, weakness and headaches.   Hematological:  Does not bruise/bleed easily.   Psychiatric/Behavioral:  Negative for agitation and confusion.             HISTORY   Medical History   History reviewed. No pertinent past medical history.         Surgical History         Past Surgical History:   Procedure Laterality Date   • HYSTERECTOMY       • TOTAL KNEE ARTHROPLASTY Right 01/2023            Family History          Family History   Problem Relation Name Age of Onset   • Hypertension Mother       • Diabetes Mother       • Hypertension Father       • Lung cancer Sister                Allergies        Allergies   Allergen Reactions   • Darvocet A500 [Propoxyphene N-Acetaminophen] Other            Tobacco Use History   Social History           Tobacco Use   Smoking Status Every Day   • Types: Cigarettes   Smokeless Tobacco Never            Social History           Substance and Sexual Activity   Alcohol Use Yes     Comment: ocassionally         HOME MEDICATIONS       Current Outpatient Medications   Medication Instructions   • atorvastatin (Lipitor) 20 mg tablet 1 tablet, oral, Daily   • buPROPion XL (Wellbutrin XL) 150 mg 24 hr tablet 1 tablet, oral, Every morning   • celecoxib (CELEBREX) 100 mg, oral, 2 times daily   • meloxicam (MOBIC) 7.5 mg            OBJECTIVE   Last Recorded Vitals.  Blood pressure 134/84, pulse 75, height 1.575 m (5' 2\"), weight 105 kg (231 lb 12.8 oz), SpO2 94%.      PHYSICAL EXAM  Physical Exam   Phone visit/previous exam:  General: Well-developed, well-nourished and in no acute distress.  Head: Normocephalic. Atraumatic.  Neck/thyroid: Neck is supple.   Eyes: Pupils equal round and reactive to light. Conjunctiva normal.  ENMT: No masses or deformity of external nose. External ears without masses.  Respiratory/Chest:  Normal respiratory effort.  Breast: Moderate to large, symmetrical breasts.  No palpable abnormality of the left breast.  On the " right breast, just above the areola, there is a 3 x 3 cm palpable mass.  There is a dimple in this skin, but no erythema and no obvious skin lesions.  No expressible nipple drainage.  Lymphatics: No palpable lymphadenopathy of the cervical, supraclavicular or axillary regions.  Cardiovascular: Regular rate and rhythm.   Abdomen: Soft, nontender, nondistended.   Musculoskeletal: Joints and limbs are grossly normal. Normal gait. Normal range of motion of major joints.  Neuro: Oriented to person, place and time. No obvious neurological deficit. Motor strength grossly normal.  Psych: Normal mood and affect.     RESULTS   Labs  Surgical Pathology Exam: [id number]  Order: [id number]   Collected 10/16/2024 12:12       Status: Final result       Visible to patient: No (inaccessible in [facility])    0 Result Notes          Component   Resulting Agency   FINAL DIAGNOSIS   A. 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     Note:  In this limited sample, the invasive carcinoma measures up to 7 mm in greatest dimension.  ER, NH and HER2 will be reported in a synoptic report.    Electronically signed by Rachid Lentz MD on 10/22/2024 at [facility]       [facility]   By the signature on this report, the individual or group listed as making the Final Interpretation/Diagnosis certifies that they have reviewed this case.    Case Summary Report   Breast Biomarker Reporting Template   Protocol posted: 12/13/2023BREAST BIOMARKER REPORTING TEMPLATE - A  Test(s) Performed       Estrogen Receptor (ER) Status   Positive (greater than 10% of cells demonstrate nuclear positivity)   Percentage of Cells with Nuclear Positivity   >95 %   Average Intensity of Staining   Strong   Test Type   Food and Drug Administration (FDA) cleared (test / vendor): Roche CONFIRM anti-(ER) (SP1) Rabbit Monoclonal Primary Antibody   Primary Antibody   SP1    Scoring System   No separate scoring system used   Test(s) Performed       Progesterone Receptor (PgR) Status   Positive   Percentage of Cells with Nuclear Positivity   90 %   Average Intensity of Staining   Strong   Test Type   Food and Drug Administration (FDA) cleared (test / vendor): Roche CONFIRM anti-(TN) (1E2) Rabbit Monoclonal Primary Anitbody   Primary Antibody   1E2   Scoring System   No separate scoring system used   Test(s) Performed       HER2 by Immunohistochemistry   Negative (Score 0)   Test Type   Food and Drug Administration (FDA) cleared (test / vendor): Roche Pathway anti-HER-2/sabrina (4B5) Rabbit Monoclonal Primary Antibody               Radiology Resutls  mammo bilateral diagnostic tomosynthesis  BI US breast limited right  Status: Final result      PACS Images      Show images for BI mammo bilateral diagnostic tomosynthesis  Signed by     Signed Time Phone Pager   Edwin De MD 10/08/2024 12:52 [phone]      Exam Information     Status Exam Begun Exam Ended   Final 10/08/2024 11:18 10/08/2024 11:45      Study Result     Narrative & Impression   Interpreted By:  Edwin De,   STUDY:  BI MAMMO BILATERAL DIAGNOSTIC TOMOSYNTHESIS; BI US BREAST LIMITED  RIGHT;  10/8/2024 11:45 am; 10/8/2024 12:10 pm      ACCESSION NUMBER(S):  [id number]; [id number]      ORDERING CLINICIAN:  MURTAZA GERBER      INDICATION:  Palpable abnormality right breast      ,N63.10 Unspecified lump in the right breast, unspecified  quadrant,N63.15 Unspecified lump in the right breast, overlapping  quadrants      COMPARISON:  No prior examination available for comparison. If a prior examination  becomes available, this examination will be compared to the prior  study. Addendum report will be issued.      FINDINGS:  MAMMOGRAPHY: 2D and tomosynthesis images were reviewed at 1 mm slice  thickness.      Density:  The breasts are almost entirely fatty.      Right breast: 3.3 cm spiculated mass identified  of the 12 o'clock  middle depth worrisome for malignancy. Prominent albeit small lymph  node of the posterior depth right breast MLO projection.      Left breast: Normal fibroglandular tissue. No findings worrisome for  malignancy.      ULTRASOUND: Targeted ultrasound was performed of the right breast by  a registered sonographer with elastography.      Right breast 12 o'clock position 3 cm from nipple demonstrates a  lobulated mass with posterior acoustic shadowing measuring up to 3.1  cm in greatest dimension. Elastography stiff.      Right axilla demonstrates a normal lymph node. The 5 mm lymph node of  the posterior depth right breast only seen on MLO projection  mammography is not sonographically apparent on this examination.      IMPRESSION:  Large right breast mass favoring malignancy.      Left breast is normal.      BI-RADS CATEGORY:  BI-RADS CATEGORY:  5 Highly Suggestive of Malignancy.  Recommendation:  Surgical Consultation and Biopsy.  Recommended Date:  Immediate.  Laterality:  Right.      Ultrasound-guided biopsy right breast recommended. Surgical  consultation with history and physical required prior to biopsy.      MR breast bilateral w contrast full protocol  Status: Final result      PACS Images      Show images for MR breast bilateral w contrast full protocol  Signed by     Signed Time Phone Pager   Em Rosales MD 11/04/2024 09:03 [phone]      Exam Information     Status Exam Begun Exam Ended   Final 10/31/2024 14:24 10/31/2024 15:02      Study Result     Narrative & Impression   Interpreted By:  Em Rosales and Marshall Colin   STUDY:  BI MR BREAST BILATERAL WITH CONTRAST FULL PROTOCOL;  10/31/2024 3:02  pm      ACCESSION NUMBER(S):  [id number]      ORDERING CLINICIAN:  JILLIAN SHEPPARD      INDICATION:  Recently diagnosed invasive ductal carcinoma of the right breast.      ,C50.411 Malignant neoplasm of upper-outer quadrant of right female  breast,Z17.0 Estrogen  receptor positive status (ER+)      COMPARISON:  Mammogram and ultrasound dated 10/08/2024 and 10/16/2024.      TECHNIQUE:  Using a dedicated breast coil, STIR axial and T1-weighted fat  saturation axial images of the breasts were obtained, the latter both  before and after intravenous administration of Gadolinium DTPA. On an  independent workstation, 3-D images were formulated using [employer]  including time enhancement curves, subtraction images and MIP images.      Intravenous contrast: 20 ML of Dotarem      FINDINGS:  Density: Almost entirely fat.      There is symmetric mild bilateral background enhancement.      RIGHT BREAST:  Within the superior central quadrant at anterior to  middle depth, there is an irregular spiculated rim enhancing mass  measuring 3.1 x 2.6 x 2.8 cm (series 502, image 105) with an internal  focus of susceptibility artifact corresponding to the biopsy tissue  marker compatible with patient's biopsy-proven right breast cancer.  There is associated skin retraction without evidence of extent to the  skin surface or chest wall. There is no evidence of nipple  involvement. No additional abnormal enhancing masses or non-mass  enhancement.      No axillary or internal mammary lymphadenopathy is appreciated.      LEFT BREAST:  No suspicious mass or nonmass enhancement is identified.      No axillary or internal mammary lymphadenopathy is appreciated.      NON-BREAST FINDINGS:  None.      IMPRESSION:  1. Biopsy-proven right breast cancer as described above. Surgical  consultation is recommended.  2. No MRI evidence of malignancy in the left breast.  3. No axillary or internal mammary lymphadenopathy.      BI-RADS CATEGORY:  BI-RADS CATEGORY:  6 Known Biopsy-Proven Malignancy.  Recommendation:  Immediate Follow-up.  Recommended Date:  Immediate.  Laterality:  Right.      For any future breast imaging appointments, please call [phone]).      I personally reviewed the images/study and I  agree with the breast  imaging fellow, Brendan Rosales D.O., findings as stated. This study  was interpreted at [facility],  [place], [place].      MACRO:  None      Signed by: Em Rosales 11/4/2024 9:03 AM  Dictation workstation:   [id number]            ASSESSMENT / PLAN   Diagnoses and all orders for this visit:  Malignant neoplasm of upper-outer quadrant of right breast in female, estrogen receptor positive  -     Case Request Operating Room: Right breast lumpectomy with sentinel lymph node biopsy  -     Referral to Radiation Oncology; Future  -     NM injection only for sentinel node biopsy  no scan performed; Future  Mass of upper outer quadrant of right breast              Plan  Oct 2024: RIGHT: palp 3.3cm mass at 12 o'clock, 3cm from nipple; IDC; gr2; >95/90/neg     1.  Pathology of the core needle biopsy of the palpable right breast mass confirmed invasive ductal carcinoma.  The size of the mass is approximately 3.3 cm by radiographic evaluation which would make this a T2 lesion.  There is some concern for possible overlying skin involvement as there is some dimpling in the skin just above the areolar region.  The MRI confirmed a 3.1 cm area of concern.  There does seem to be some retraction of the overlying skin, but no obvious involvement of cancer within the skin itself.  Radiographically, lymph nodes are negative.  2.  Since clinically her lymph nodes are negative, we will plan to proceed with surgical intervention.  Discussed the benefits and risk of breast conservation surgery versus mastectomy.  She is elected to undergo breast conservation surgery which would include a right breast lumpectomy (partial mastectomy), sentinel lymph node biopsy with postoperative XRT.  Although there is no overlying skin involvement with the cancer, it is very close to the skin surface.  Therefore, we will plan to do an excision of the skin overlying the palpable mass at the time of  her surgery as well.  3.  Her cancer is ER/RI positive.  Will plan for referral to medical oncology postoperatively for endocrine therapy.  4.  Refer to radiation oncology for adjuvant XRT.  5.  She does not have any family history of breast cancer; therefore, does not meet criteria for referral to genetics.  6.  Would have her hold the meloxicam or Celebrex for 1 week preoperatively if possible to reduce her risk of perioperative bleeding.  She would like to wait until after Thanksgiving to have her surgery.  Surgery will be tentatively scheduled for 12/9/2024.     Richa Felix MD, FACS  [facility] General Surgery  [address] [place] [address];   [address]  [place]  [address] [phone]

## 2024-12-08 NOTE — H&P
GENERAL SURGERY PREOP H&P     Patient: Vira Gordon    Age: 69 y.o.   Gender: female    MRN: [id number]    PCP: Aurora Delgadillo MD          SUBJECTIVE         JAVAD Constantinois a 69 year old white female who presents for a right breast lumpectomy (partial mastectomy) and SLNB for right breast cancer. She originally presented with a palpable right breast mass. CNB showed IDC.  She had undergone a breast MRI since her last office appointment when she was diagnosed with hormone sensitive invasive ductal carcinoma.  She is here to review MRI results and discuss surgical intervention.  The MRI identified the mass at 3.1 cm with overlying skin dimpling, but no obvious skin involvement of the cancer.  Radiographically, all lymph nodes are negative.     Risk factors for breast cancer: 69-year-old white female; menarche at age 14; first live birth at age 20; no previous breast biopsy; no family history of breast cancer.  No family history of ovarian/pancreatic/prostate cancer.  She has a sister who had tobacco related lung cancer.  She went through menopause around her mid 50s, never took any hormone replacement therapy.  This gives her a 5-year Antonia score of 1.2% and a lifetime risk of 4.2% which puts her in a less than average risk category.     ROS  Review of Systems   Constitutional:  Negative for chills, fatigue and fever.   HENT:  Negative for congestion, ear pain and hearing loss.    Eyes:  Negative for pain and redness.   Respiratory:  Positive for cough. Negative for shortness of breath.    Cardiovascular:  Negative for chest pain and leg swelling.   Gastrointestinal:  Negative for abdominal pain, constipation, diarrhea, nausea and vomiting.   Endocrine: Negative for polyphagia.   Genitourinary:  Negative for dysuria, flank pain, frequency and hematuria.   Musculoskeletal:  Positive for arthralgias. Negative for myalgias.   Skin:  Negative for rash and wound.   Allergic/Immunologic: Negative for immunocompromised  "state.   Neurological:  Negative for speech difficulty, weakness and headaches.   Hematological:  Does not bruise/bleed easily.   Psychiatric/Behavioral:  Negative for agitation and confusion.             HISTORY   Medical History   History reviewed. No pertinent past medical history.         Surgical History         Past Surgical History:   Procedure Laterality Date    HYSTERECTOMY        TOTAL KNEE ARTHROPLASTY Right 01/2023            Family History          Family History   Problem Relation Name Age of Onset    Hypertension Mother        Diabetes Mother        Hypertension Father        Lung cancer Sister                Allergies        Allergies   Allergen Reactions    Darvocet A500 [Propoxyphene N-Acetaminophen] Other            Tobacco Use History   Social History           Tobacco Use   Smoking Status Every Day    Types: Cigarettes   Smokeless Tobacco Never            Social History           Substance and Sexual Activity   Alcohol Use Yes     Comment: ocassionally         HOME MEDICATIONS       Current Outpatient Medications   Medication Instructions    atorvastatin (Lipitor) 20 mg tablet 1 tablet, oral, Daily    buPROPion XL (Wellbutrin XL) 150 mg 24 hr tablet 1 tablet, oral, Every morning    celecoxib (CELEBREX) 100 mg, oral, 2 times daily    meloxicam (MOBIC) 7.5 mg            OBJECTIVE   Last Recorded Vitals.  Blood pressure 134/84, pulse 75, height 1.575 m (5' 2\"), weight 105 kg (231 lb 12.8 oz), SpO2 94%.      PHYSICAL EXAM  Physical Exam   Phone visit/previous exam:  General: Well-developed, well-nourished and in no acute distress.  Head: Normocephalic. Atraumatic.  Neck/thyroid: Neck is supple.   Eyes: Pupils equal round and reactive to light. Conjunctiva normal.  ENMT: No masses or deformity of external nose. External ears without masses.  Respiratory/Chest:  Normal respiratory effort.  Breast: Moderate to large, symmetrical breasts.  No palpable abnormality of the left breast.  On the right " breast, just above the areola, there is a 3 x 3 cm palpable mass.  There is a dimple in this skin, but no erythema and no obvious skin lesions.  No expressible nipple drainage.  Lymphatics: No palpable lymphadenopathy of the cervical, supraclavicular or axillary regions.  Cardiovascular: Regular rate and rhythm.   Abdomen: Soft, nontender, nondistended.   Musculoskeletal: Joints and limbs are grossly normal. Normal gait. Normal range of motion of major joints.  Neuro: Oriented to person, place and time. No obvious neurological deficit. Motor strength grossly normal.  Psych: Normal mood and affect.     RESULTS   Labs  Surgical Pathology Exam: [id number]  Order: [id number]   Collected 10/16/2024 12:12       Status: Final result       Visible to patient: No (inaccessible in [facility])    0 Result Notes          Component   Resulting Agency   FINAL DIAGNOSIS   A. 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     Note:  In this limited sample, the invasive carcinoma measures up to 7 mm in greatest dimension.  ER, IN and HER2 will be reported in a synoptic report.    Electronically signed by Rachid Lentz MD on 10/22/2024 at [facility]       [facility]   By the signature on this report, the individual or group listed as making the Final Interpretation/Diagnosis certifies that they have reviewed this case.    Case Summary Report   Breast Biomarker Reporting Template   Protocol posted: 12/13/2023BREAST BIOMARKER REPORTING TEMPLATE - A  Test(s) Performed       Estrogen Receptor (ER) Status   Positive (greater than 10% of cells demonstrate nuclear positivity)   Percentage of Cells with Nuclear Positivity   >95 %   Average Intensity of Staining   Strong   Test Type   Food and Drug Administration (FDA) cleared (test / vendor): Roche CONFIRM anti-(ER) (SP1) Rabbit Monoclonal Primary Antibody   Primary Antibody   SP1   Scoring  System   No separate scoring system used   Test(s) Performed       Progesterone Receptor (PgR) Status   Positive   Percentage of Cells with Nuclear Positivity   90 %   Average Intensity of Staining   Strong   Test Type   Food and Drug Administration (FDA) cleared (test / vendor): Roche CONFIRM anti-(UT) (1E2) Rabbit Monoclonal Primary Anitbody   Primary Antibody   1E2   Scoring System   No separate scoring system used   Test(s) Performed       HER2 by Immunohistochemistry   Negative (Score 0)   Test Type   Food and Drug Administration (FDA) cleared (test / vendor): Roche Pathway anti-HER-2/sabrina (4B5) Rabbit Monoclonal Primary Antibody               Radiology Resutls  mammo bilateral diagnostic tomosynthesis  BI US breast limited right  Status: Final result      PACS Images      Show images for BI mammo bilateral diagnostic tomosynthesis  Signed by     Signed Time Phone Pager   Edwin De MD 10/08/2024 12:52 [phone]      Exam Information     Status Exam Begun Exam Ended   Final 10/08/2024 11:18 10/08/2024 11:45      Study Result     Narrative & Impression   Interpreted By:  Edwin De,   STUDY:  BI MAMMO BILATERAL DIAGNOSTIC TOMOSYNTHESIS; BI US BREAST LIMITED  RIGHT;  10/8/2024 11:45 am; 10/8/2024 12:10 pm      ACCESSION NUMBER(S):  [id number]; [id number]      ORDERING CLINICIAN:  MURTAZA GERBER      INDICATION:  Palpable abnormality right breast      ,N63.10 Unspecified lump in the right breast, unspecified  quadrant,N63.15 Unspecified lump in the right breast, overlapping  quadrants      COMPARISON:  No prior examination available for comparison. If a prior examination  becomes available, this examination will be compared to the prior  study. Addendum report will be issued.      FINDINGS:  MAMMOGRAPHY: 2D and tomosynthesis images were reviewed at 1 mm slice  thickness.      Density:  The breasts are almost entirely fatty.      Right breast: 3.3 cm spiculated mass identified of the 12  o'clock  middle depth worrisome for malignancy. Prominent albeit small lymph  node of the posterior depth right breast MLO projection.      Left breast: Normal fibroglandular tissue. No findings worrisome for  malignancy.      ULTRASOUND: Targeted ultrasound was performed of the right breast by  a registered sonographer with elastography.      Right breast 12 o'clock position 3 cm from nipple demonstrates a  lobulated mass with posterior acoustic shadowing measuring up to 3.1  cm in greatest dimension. Elastography stiff.      Right axilla demonstrates a normal lymph node. The 5 mm lymph node of  the posterior depth right breast only seen on MLO projection  mammography is not sonographically apparent on this examination.      IMPRESSION:  Large right breast mass favoring malignancy.      Left breast is normal.      BI-RADS CATEGORY:  BI-RADS CATEGORY:  5 Highly Suggestive of Malignancy.  Recommendation:  Surgical Consultation and Biopsy.  Recommended Date:  Immediate.  Laterality:  Right.      Ultrasound-guided biopsy right breast recommended. Surgical  consultation with history and physical required prior to biopsy.      MR breast bilateral w contrast full protocol  Status: Final result      PACS Images      Show images for MR breast bilateral w contrast full protocol  Signed by     Signed Time Phone Pager   Em Rosales MD 11/04/2024 09:03 [phone]      Exam Information     Status Exam Begun Exam Ended   Final 10/31/2024 14:24 10/31/2024 15:02      Study Result     Narrative & Impression   Interpreted By:  Em Rosales and Marshall Colin   STUDY:  BI MR BREAST BILATERAL WITH CONTRAST FULL PROTOCOL;  10/31/2024 3:02  pm      ACCESSION NUMBER(S):  [id number]      ORDERING CLINICIAN:  JILLIAN SHEPPARD      INDICATION:  Recently diagnosed invasive ductal carcinoma of the right breast.      ,C50.411 Malignant neoplasm of upper-outer quadrant of right female  breast,Z17.0 Estrogen receptor positive  status (ER+)      COMPARISON:  Mammogram and ultrasound dated 10/08/2024 and 10/16/2024.      TECHNIQUE:  Using a dedicated breast coil, STIR axial and T1-weighted fat  saturation axial images of the breasts were obtained, the latter both  before and after intravenous administration of Gadolinium DTPA. On an  independent workstation, 3-D images were formulated using IndigozD  including time enhancement curves, subtraction images and MIP images.      Intravenous contrast: 20 ML of Dotarem      FINDINGS:  Density: Almost entirely fat.      There is symmetric mild bilateral background enhancement.      RIGHT BREAST:  Within the superior central quadrant at anterior to  middle depth, there is an irregular spiculated rim enhancing mass  measuring 3.1 x 2.6 x 2.8 cm (series 502, image 105) with an internal  focus of susceptibility artifact corresponding to the biopsy tissue  marker compatible with patient's biopsy-proven right breast cancer.  There is associated skin retraction without evidence of extent to the  skin surface or chest wall. There is no evidence of nipple  involvement. No additional abnormal enhancing masses or non-mass  enhancement.      No axillary or internal mammary lymphadenopathy is appreciated.      LEFT BREAST:  No suspicious mass or nonmass enhancement is identified.      No axillary or internal mammary lymphadenopathy is appreciated.      NON-BREAST FINDINGS:  None.      IMPRESSION:  1. Biopsy-proven right breast cancer as described above. Surgical  consultation is recommended.  2. No MRI evidence of malignancy in the left breast.  3. No axillary or internal mammary lymphadenopathy.      BI-RADS CATEGORY:  BI-RADS CATEGORY:  6 Known Biopsy-Proven Malignancy.  Recommendation:  Immediate Follow-up.  Recommended Date:  Immediate.  Laterality:  Right.      For any future breast imaging appointments, please call [phone]  [phone]).      I personally reviewed the images/study and I agree with the  breast  imaging fellow, Brendan Rosales D.O., findings as stated. This study  was interpreted at [facility],  [place], [place].      MACRO:  None      Signed by: Em Rosales 11/4/2024 9:03 AM  Dictation workstation:   [id number]            ASSESSMENT / PLAN   Diagnoses and all orders for this visit:  Malignant neoplasm of upper-outer quadrant of right breast in female, estrogen receptor positive  -     Case Request Operating Room: Right breast lumpectomy with sentinel lymph node biopsy  -     Referral to Radiation Oncology; Future  -     NM injection only for sentinel node biopsy  no scan performed; Future  Mass of upper outer quadrant of right breast              Plan  Oct 2024: RIGHT: palp 3.3cm mass at 12 o'clock, 3cm from nipple; IDC; gr2; >95/90/neg     1.  Pathology of the core needle biopsy of the palpable right breast mass confirmed invasive ductal carcinoma.  The size of the mass is approximately 3.3 cm by radiographic evaluation which would make this a T2 lesion.  There is some concern for possible overlying skin involvement as there is some dimpling in the skin just above the areolar region.  The MRI confirmed a 3.1 cm area of concern.  There does seem to be some retraction of the overlying skin, but no obvious involvement of cancer within the skin itself.  Radiographically, lymph nodes are negative.  2.  Since clinically her lymph nodes are negative, we will plan to proceed with surgical intervention.  Discussed the benefits and risk of breast conservation surgery versus mastectomy.  She is elected to undergo breast conservation surgery which would include a right breast lumpectomy (partial mastectomy), sentinel lymph node biopsy with postoperative XRT.  Although there is no overlying skin involvement with the cancer, it is very close to the skin surface.  Therefore, we will plan to do an excision of the skin overlying the palpable mass at the time of her surgery as  well.  3.  Her cancer is ER/CT positive.  Will plan for referral to medical oncology postoperatively for endocrine therapy.  4.  Refer to radiation oncology for adjuvant XRT.  5.  She does not have any family history of breast cancer; therefore, does not meet criteria for referral to genetics.  6.  Would have her hold the meloxicam or Celebrex for 1 week preoperatively if possible to reduce her risk of perioperative bleeding.  She would like to wait until after Thanksgiving to have her surgery.  Surgery will be tentatively scheduled for 12/9/2024.     Richa Felix MD, FACS  [facility] General Surgery  [address] [place] [address];   [address]  [place]  [address] [phone]

## 2024-12-09 ENCOUNTER — PHARMACY VISIT (OUTPATIENT)
Dept: PHARMACY | Facility: CLINIC | Age: 69
End: 2024-12-09
Payer: MEDICARE

## 2024-12-09 ENCOUNTER — ANESTHESIA (OUTPATIENT)
Dept: OPERATING ROOM | Facility: HOSPITAL | Age: 69
End: 2024-12-09
Payer: COMMERCIAL

## 2024-12-09 ENCOUNTER — HOSPITAL ENCOUNTER (OUTPATIENT)
Facility: HOSPITAL | Age: 69
Setting detail: OUTPATIENT SURGERY
Discharge: HOME | End: 2024-12-09
Attending: SURGERY | Admitting: SURGERY
Payer: COMMERCIAL

## 2024-12-09 ENCOUNTER — HOSPITAL ENCOUNTER (OUTPATIENT)
Dept: RADIOLOGY | Facility: HOSPITAL | Age: 69
Setting detail: OUTPATIENT SURGERY
Discharge: HOME | End: 2024-12-09
Payer: COMMERCIAL

## 2024-12-09 VITALS
RESPIRATION RATE: 18 BRPM | OXYGEN SATURATION: 100 % | TEMPERATURE: 97.3 F | HEART RATE: 58 BPM | WEIGHT: 231 LBS | SYSTOLIC BLOOD PRESSURE: 113 MMHG | HEIGHT: 62 IN | BODY MASS INDEX: 42.51 KG/M2 | DIASTOLIC BLOOD PRESSURE: 75 MMHG

## 2024-12-09 DIAGNOSIS — C50.411 MALIGNANT NEOPLASM OF UPPER-OUTER QUADRANT OF RIGHT BREAST IN FEMALE, ESTROGEN RECEPTOR POSITIVE: ICD-10-CM

## 2024-12-09 DIAGNOSIS — Z17.0 MALIGNANT NEOPLASM OF UPPER-OUTER QUADRANT OF RIGHT BREAST IN FEMALE, ESTROGEN RECEPTOR POSITIVE: Primary | ICD-10-CM

## 2024-12-09 DIAGNOSIS — Z17.0 MALIGNANT NEOPLASM OF UPPER-OUTER QUADRANT OF RIGHT BREAST IN FEMALE, ESTROGEN RECEPTOR POSITIVE: ICD-10-CM

## 2024-12-09 DIAGNOSIS — C50.411 MALIGNANT NEOPLASM OF UPPER-OUTER QUADRANT OF RIGHT BREAST IN FEMALE, ESTROGEN RECEPTOR POSITIVE: Primary | ICD-10-CM

## 2024-12-09 PROCEDURE — 38525 BIOPSY/REMOVAL LYMPH NODES: CPT | Performed by: SURGERY

## 2024-12-09 PROCEDURE — 7100000010 HC PHASE TWO TIME - EACH INCREMENTAL 1 MINUTE: Performed by: SURGERY

## 2024-12-09 PROCEDURE — 7100000001 HC RECOVERY ROOM TIME - INITIAL BASE CHARGE: Performed by: SURGERY

## 2024-12-09 PROCEDURE — 7100000009 HC PHASE TWO TIME - INITIAL BASE CHARGE: Performed by: SURGERY

## 2024-12-09 PROCEDURE — 2720000007 HC OR 272 NO HCPCS: Performed by: SURGERY

## 2024-12-09 PROCEDURE — 7100000002 HC RECOVERY ROOM TIME - EACH INCREMENTAL 1 MINUTE: Performed by: SURGERY

## 2024-12-09 PROCEDURE — 88307 TISSUE EXAM BY PATHOLOGIST: CPT | Performed by: STUDENT IN AN ORGANIZED HEALTH CARE EDUCATION/TRAINING PROGRAM

## 2024-12-09 PROCEDURE — 96372 THER/PROPH/DIAG INJ SC/IM: CPT | Performed by: STUDENT IN AN ORGANIZED HEALTH CARE EDUCATION/TRAINING PROGRAM

## 2024-12-09 PROCEDURE — 2500000004 HC RX 250 GENERAL PHARMACY W/ HCPCS (ALT 636 FOR OP/ED): Performed by: NURSE ANESTHETIST, CERTIFIED REGISTERED

## 2024-12-09 PROCEDURE — 2500000005 HC RX 250 GENERAL PHARMACY W/O HCPCS: Performed by: SURGERY

## 2024-12-09 PROCEDURE — 88307 TISSUE EXAM BY PATHOLOGIST: CPT | Mod: TC,PORLAB | Performed by: SURGERY

## 2024-12-09 PROCEDURE — 2500000004 HC RX 250 GENERAL PHARMACY W/ HCPCS (ALT 636 FOR OP/ED): Performed by: ANESTHESIOLOGY

## 2024-12-09 PROCEDURE — 2500000004 HC RX 250 GENERAL PHARMACY W/ HCPCS (ALT 636 FOR OP/ED): Mod: JZ | Performed by: SURGERY

## 2024-12-09 PROCEDURE — RXMED WILLOW AMBULATORY MEDICATION CHARGE

## 2024-12-09 PROCEDURE — 38792 RA TRACER ID OF SENTINL NODE: CPT

## 2024-12-09 PROCEDURE — 2500000005 HC RX 250 GENERAL PHARMACY W/O HCPCS: Performed by: NURSE ANESTHETIST, CERTIFIED REGISTERED

## 2024-12-09 PROCEDURE — 3700000001 HC GENERAL ANESTHESIA TIME - INITIAL BASE CHARGE: Performed by: SURGERY

## 2024-12-09 PROCEDURE — 38900 IO MAP OF SENT LYMPH NODE: CPT | Performed by: SURGERY

## 2024-12-09 PROCEDURE — 3430000001 HC RX 343 DIAGNOSTIC RADIOPHARMACEUTICALS: Performed by: STUDENT IN AN ORGANIZED HEALTH CARE EDUCATION/TRAINING PROGRAM

## 2024-12-09 PROCEDURE — 19301 PARTIAL MASTECTOMY: CPT | Performed by: SURGERY

## 2024-12-09 PROCEDURE — 3600000009 HC OR TIME - EACH INCREMENTAL 1 MINUTE - PROCEDURE LEVEL FOUR: Performed by: SURGERY

## 2024-12-09 PROCEDURE — 3600000004 HC OR TIME - INITIAL BASE CHARGE - PROCEDURE LEVEL FOUR: Performed by: SURGERY

## 2024-12-09 PROCEDURE — 3700000002 HC GENERAL ANESTHESIA TIME - EACH INCREMENTAL 1 MINUTE: Performed by: SURGERY

## 2024-12-09 PROCEDURE — A9520 TC99 TILMANOCEPT DIAG 0.5MCI: HCPCS | Performed by: STUDENT IN AN ORGANIZED HEALTH CARE EDUCATION/TRAINING PROGRAM

## 2024-12-09 RX ORDER — ESMOLOL HYDROCHLORIDE 10 MG/ML
INJECTION INTRAVENOUS AS NEEDED
Status: DISCONTINUED | OUTPATIENT
Start: 2024-12-09 | End: 2024-12-09

## 2024-12-09 RX ORDER — ONDANSETRON HYDROCHLORIDE 2 MG/ML
4 INJECTION, SOLUTION INTRAVENOUS ONCE AS NEEDED
Status: DISCONTINUED | OUTPATIENT
Start: 2024-12-09 | End: 2024-12-09 | Stop reason: HOSPADM

## 2024-12-09 RX ORDER — PROPOFOL 10 MG/ML
INJECTION, EMULSION INTRAVENOUS AS NEEDED
Status: DISCONTINUED | OUTPATIENT
Start: 2024-12-09 | End: 2024-12-09

## 2024-12-09 RX ORDER — KETOROLAC TROMETHAMINE 30 MG/ML
INJECTION, SOLUTION INTRAMUSCULAR; INTRAVENOUS AS NEEDED
Status: DISCONTINUED | OUTPATIENT
Start: 2024-12-09 | End: 2024-12-09

## 2024-12-09 RX ORDER — ALBUTEROL SULFATE 0.83 MG/ML
2.5 SOLUTION RESPIRATORY (INHALATION) ONCE AS NEEDED
Status: DISCONTINUED | OUTPATIENT
Start: 2024-12-09 | End: 2024-12-09 | Stop reason: HOSPADM

## 2024-12-09 RX ORDER — HYDROCODONE BITARTRATE AND ACETAMINOPHEN 7.5; 325 MG/1; MG/1
1 TABLET ORAL EVERY 6 HOURS PRN
Qty: 10 TABLET | Refills: 0 | Status: SHIPPED | OUTPATIENT
Start: 2024-12-09 | End: 2024-12-24 | Stop reason: WASHOUT

## 2024-12-09 RX ORDER — SODIUM CHLORIDE, SODIUM LACTATE, POTASSIUM CHLORIDE, CALCIUM CHLORIDE 600; 310; 30; 20 MG/100ML; MG/100ML; MG/100ML; MG/100ML
100 INJECTION, SOLUTION INTRAVENOUS CONTINUOUS
Status: DISCONTINUED | OUTPATIENT
Start: 2024-12-09 | End: 2024-12-09 | Stop reason: HOSPADM

## 2024-12-09 RX ORDER — METHYLENE BLUE 10 MG/ML
INJECTION INTRAVENOUS AS NEEDED
Status: DISCONTINUED | OUTPATIENT
Start: 2024-12-09 | End: 2024-12-09 | Stop reason: HOSPADM

## 2024-12-09 RX ORDER — DIPHENHYDRAMINE HYDROCHLORIDE 50 MG/ML
12.5 INJECTION INTRAMUSCULAR; INTRAVENOUS ONCE AS NEEDED
Status: DISCONTINUED | OUTPATIENT
Start: 2024-12-09 | End: 2024-12-09 | Stop reason: HOSPADM

## 2024-12-09 RX ORDER — MIDAZOLAM HYDROCHLORIDE 1 MG/ML
INJECTION, SOLUTION INTRAMUSCULAR; INTRAVENOUS AS NEEDED
Status: DISCONTINUED | OUTPATIENT
Start: 2024-12-09 | End: 2024-12-09

## 2024-12-09 RX ORDER — HYDRALAZINE HYDROCHLORIDE 20 MG/ML
5 INJECTION INTRAMUSCULAR; INTRAVENOUS EVERY 30 MIN PRN
Status: DISCONTINUED | OUTPATIENT
Start: 2024-12-09 | End: 2024-12-09 | Stop reason: HOSPADM

## 2024-12-09 RX ORDER — BUPIVACAINE HCL/EPINEPHRINE 0.5-1:200K
VIAL (ML) INJECTION AS NEEDED
Status: DISCONTINUED | OUTPATIENT
Start: 2024-12-09 | End: 2024-12-09 | Stop reason: HOSPADM

## 2024-12-09 RX ORDER — MORPHINE SULFATE 2 MG/ML
2 INJECTION, SOLUTION INTRAMUSCULAR; INTRAVENOUS EVERY 5 MIN PRN
Status: DISCONTINUED | OUTPATIENT
Start: 2024-12-09 | End: 2024-12-09 | Stop reason: HOSPADM

## 2024-12-09 RX ORDER — LIDOCAINE HYDROCHLORIDE 20 MG/ML
INJECTION, SOLUTION INFILTRATION; PERINEURAL AS NEEDED
Status: DISCONTINUED | OUTPATIENT
Start: 2024-12-09 | End: 2024-12-09

## 2024-12-09 RX ORDER — LIDOCAINE HYDROCHLORIDE 10 MG/ML
0.1 INJECTION, SOLUTION EPIDURAL; INFILTRATION; INTRACAUDAL; PERINEURAL ONCE
Status: DISCONTINUED | OUTPATIENT
Start: 2024-12-09 | End: 2024-12-09 | Stop reason: HOSPADM

## 2024-12-09 RX ORDER — FENTANYL CITRATE 50 UG/ML
INJECTION, SOLUTION INTRAMUSCULAR; INTRAVENOUS AS NEEDED
Status: DISCONTINUED | OUTPATIENT
Start: 2024-12-09 | End: 2024-12-09

## 2024-12-09 RX ORDER — FAMOTIDINE 10 MG/ML
20 INJECTION INTRAVENOUS ONCE
Status: COMPLETED | OUTPATIENT
Start: 2024-12-09 | End: 2024-12-09

## 2024-12-09 RX ORDER — ONDANSETRON HYDROCHLORIDE 2 MG/ML
INJECTION, SOLUTION INTRAVENOUS AS NEEDED
Status: DISCONTINUED | OUTPATIENT
Start: 2024-12-09 | End: 2024-12-09

## 2024-12-09 RX ORDER — LABETALOL HYDROCHLORIDE 5 MG/ML
5 INJECTION, SOLUTION INTRAVENOUS ONCE AS NEEDED
Status: DISCONTINUED | OUTPATIENT
Start: 2024-12-09 | End: 2024-12-09 | Stop reason: HOSPADM

## 2024-12-09 RX ORDER — CEFAZOLIN SODIUM 2 G/100ML
2 INJECTION, SOLUTION INTRAVENOUS ONCE
Status: COMPLETED | OUTPATIENT
Start: 2024-12-09 | End: 2024-12-09

## 2024-12-09 RX ORDER — NORETHINDRONE AND ETHINYL ESTRADIOL 0.5-0.035
KIT ORAL AS NEEDED
Status: DISCONTINUED | OUTPATIENT
Start: 2024-12-09 | End: 2024-12-09

## 2024-12-09 RX ORDER — MEPERIDINE HYDROCHLORIDE 25 MG/ML
12.5 INJECTION INTRAMUSCULAR; INTRAVENOUS; SUBCUTANEOUS EVERY 10 MIN PRN
Status: DISCONTINUED | OUTPATIENT
Start: 2024-12-09 | End: 2024-12-09 | Stop reason: HOSPADM

## 2024-12-09 RX ORDER — DROPERIDOL 2.5 MG/ML
0.62 INJECTION, SOLUTION INTRAMUSCULAR; INTRAVENOUS ONCE AS NEEDED
Status: DISCONTINUED | OUTPATIENT
Start: 2024-12-09 | End: 2024-12-09 | Stop reason: HOSPADM

## 2024-12-09 RX ADMIN — HYDROMORPHONE HYDROCHLORIDE 0.5 MG: 0.5 INJECTION, SOLUTION INTRAMUSCULAR; INTRAVENOUS; SUBCUTANEOUS at 13:52

## 2024-12-09 RX ADMIN — FAMOTIDINE 20 MG: 10 INJECTION, SOLUTION INTRAVENOUS at 08:35

## 2024-12-09 RX ADMIN — HYDROMORPHONE HYDROCHLORIDE 0.5 MG: 0.5 INJECTION, SOLUTION INTRAMUSCULAR; INTRAVENOUS; SUBCUTANEOUS at 13:42

## 2024-12-09 SDOH — HEALTH STABILITY: MENTAL HEALTH: CURRENT SMOKER: 1

## 2024-12-09 ASSESSMENT — COLUMBIA-SUICIDE SEVERITY RATING SCALE - C-SSRS
1. IN THE PAST MONTH, HAVE YOU WISHED YOU WERE DEAD OR WISHED YOU COULD GO TO SLEEP AND NOT WAKE UP?: NO
2. HAVE YOU ACTUALLY HAD ANY THOUGHTS OF KILLING YOURSELF?: NO
6. HAVE YOU EVER DONE ANYTHING, STARTED TO DO ANYTHING, OR PREPARED TO DO ANYTHING TO END YOUR LIFE?: NO

## 2024-12-09 ASSESSMENT — PAIN - FUNCTIONAL ASSESSMENT
PAIN_FUNCTIONAL_ASSESSMENT: 0-10

## 2024-12-09 ASSESSMENT — PAIN DESCRIPTION - LOCATION
LOCATION: BREAST
LOCATION: BREAST

## 2024-12-09 ASSESSMENT — PAIN SCALES - GENERAL
PAINLEVEL_OUTOF10: 0 - NO PAIN
PAINLEVEL_OUTOF10: 5 - MODERATE PAIN
PAINLEVEL_OUTOF10: 4
PAINLEVEL_OUTOF10: 7
PAIN_LEVEL: 5
PAINLEVEL_OUTOF10: 5 - MODERATE PAIN
PAINLEVEL_OUTOF10: 8
PAINLEVEL_OUTOF10: 0 - NO PAIN

## 2024-12-09 ASSESSMENT — PAIN DESCRIPTION - ORIENTATION
ORIENTATION: RIGHT
ORIENTATION: RIGHT

## 2024-12-09 NOTE — OP NOTE
Right breast lumpectomy with sentinel lymph node biopsy (SENTINEL) (R) Operative Note     Date: 2024  OR Location: [facility] OR    Name: Vira Gordon : 1955, Age: 69 y.o., MRN: [id number], Sex: female    Diagnosis  Pre-op Diagnosis      * Malignant neoplasm of upper-outer quadrant of right breast in female, estrogen receptor positive [C50.411, Z17.0] Post-op Diagnosis     * Malignant neoplasm of upper-outer quadrant of right breast in female, estrogen receptor positive [C50.411, Z17.0]     Procedures  Right breast lumpectomy with sentinel lymph node biopsy (SENTINEL)  49484 - MN MASTECTOMY PARTIAL    MN INTRAOP SENTINEL LYMPH NODE ID W/DYE INJECTION [26946]  MN BX/EXC LYMPH NODE OPEN DEEP AXILLARY NODE [70864]  Surgeons      * Richa Felix - Primary    Resident/Fellow/Other Assistant:  Surgeons and Role:  * No surgeons found with a matching role *    Staff:   Scrub Person: Gwen  Circulator: Denita  Surgical Assistant: Stefanie Lopez Circulator: Ching  Scrub Person: Patricia    Anesthesia Staff: CRNA: BIBI Shea-TASHA    Procedure Summary  Anesthesia: General  ASA: II  Estimated Blood Loss: 10mL  Intra-op Medications:   Administrations occurring from 0945 to 1155 on 24:   Medication Name Total Dose   methylene blue (Provayblue) 1 % (10 mg/mL) injection 50 mg   dexAMETHasone (Decadron) injection 4 mg/mL 4 mg   ePHEDrine 50 mg/mL 10 mg   esmolol (Brevibloc) 10 mcg/mL  IV bolus 70 mg   fentaNYL (Sublimaze) injection 50 mcg/mL 100 mcg   lidocaine (Xylocaine) injection 2 % 100 mg   midazolam (Versed) injection 1 mg/mL 2 mg   ondansetron (Zofran) 2 mg/mL injection 4 mg   propofol (Diprivan) injection 10 mg/mL 200 mg   ceFAZolin (Ancef) 2 g in dextrose (iso)  mL 2 g              Anesthesia Record               Intraprocedure I/O Totals       None           Specimen:   ID Type Source Tests Collected by Time   1 : SENTINEL LYMPH NODE #1 Tissue SENTINEL LYMPH NODE BREAST RIGHT  SURGICAL PATHOLOGY EXAM Richa Felix MD 12/9/2024 1129   2 : SENTINEL LYMPH NODE #2 Tissue SENTINEL LYMPH NODE BREAST RIGHT SURGICAL PATHOLOGY EXAM Richa Felix MD 12/9/2024 1203   3 : SENTINEL LYMPH NODE #3 Tissue SENTINEL LYMPH NODE BREAST RIGHT SURGICAL PATHOLOGY EXAM Richa Felix MD 12/9/2024 1211   4 : RIGHT BREAST LUMPECTOMY, MARGIN MARKER PAINT Tissue BREAST LUMPECTOMY RIGHT SURGICAL PATHOLOGY EXAM Richa Felix MD 12/9/2024 1237                 Drains and/or Catheters: * None in log *    Tourniquet Times:         Implants:     Findings: Palpable mass of the right breast.  3 sentinel lymph nodes removed.    Indications: Vira Gordon is an 69 y.o. female who is having surgery for Malignant neoplasm of upper-outer quadrant of right breast in female, estrogen receptor positive [C50.411, Z17.0].  She had presented with a palpable mass of the right breast.  Core needle biopsy showed invasive ductal carcinoma.  Clinically, her axilla was negative.  Benefits and risk of breast conservation surgery were reviewed with the patient, and she signed consent.  In the preop holding area, radiology injected nuclear medicine for the sentinel lymph node biopsy.    The patient was seen in the preoperative area. The risks, benefits, complications, treatment options, non-operative alternatives, expected recovery and outcomes were discussed with the patient. The possibilities of reaction to medication, pulmonary aspiration, injury to surrounding structures, bleeding, recurrent infection, the need for additional procedures, failure to diagnose a condition, and creating a complication requiring transfusion or operation were discussed with the patient. The patient concurred with the proposed plan, giving informed consent.  The site of surgery was properly noted/marked if necessary per policy. The patient has been actively warmed in preoperative area. Preoperative antibiotics have been ordered and given  "within 1 hours of incision. Venous thrombosis prophylaxis have been ordered including bilateral sequential compression devices    Procedure Details:   She was brought into the operating room and was laid in the supine position.  After placement under general anesthesia, knee-high JENNIFER hose and SCDs were placed on bilateral lower extremities.  5 cc of methylene blue that was injected into the right lateral periareolar region.  Massage was undertaken for 5 minutes.  The right breast and axillary region then was prepped with ChloraPrep and draped usual sterile fashion.  A pause was taken the correct patient and procedure were identified.    Attention was first placed towards the axilla.  Using the neoprobe, the breast count was 6375.  An area of \"hotspot\" in the lower axillary region was identified.  Using half percent Marcaine with epinephrine, the inferior aspect of the axillary region was locally anesthetized.  A 4 cm curvilinear incision was made over the area of the lower axillary region.  This incision was carried down into the level of the axillary contents.  Using a combination of blunt and cautery dissection, eventually 3 sentinel lymph nodes were identified.  1 was low in the axilla, one was in the mid axilla and one was against the chest wall just behind the pectoralis muscle.  The first sentinel lymph node was blue, palpable and a count of 709.  [place] lymph node #2 was not blue, was palpable and a count of only 33.  The third sentinel lymph node was blue, palpable and a count of 599.  [place] lymph node #2 seem to have a a lot of indurated tissue around the lymph node itself, and the indurated tissue was excised with the visible lymph node.  At the end of the case, the axillary count was 34.  The axilla then was copiously irrigated.  There was no evidence of any active bleeding.  The deep axillary tissue then was reapproximated using interrupted stitches of 2-0 Vicryl suture, and the skin was " reapproximated using a running subcuticular stitch of 4-0 Vicryl suture.    Attention now was placed towards the breast.  The area of concern had been marked in the preop holding area.  An elliptical incision was made around the area of the skin dimpling.  Local anesthetic had previously been injected.  Using palpation as a guide, a small amount of normal tissue was taken around the palpable mass.  This dissection did go all the way to, but not including, the underlying pectoralis muscle.  Once the lumpectomy was completed, it was marked with the margin marker kit for breast specimen.  The cavity then was checked.  There was no evidence of any bleeding.  4 clips were placed in the cavity for possible XRT boost.  The superficial breast tissue then was reapproximated using interrupted stitches of 2-0 Vicryl suture.  The skin was reapproximated using a running subcuticular stitch of 4-0 Vicryl suture.  This wound was also dressed with Steri-Strips, 4 x 4 and tape.  The axillary incision was also covered with Steri-Strips, 4 x 4's and tape.  A standard postoperative bra was put in place.  She then was awoken from anesthesia and taken to the recovery room in stable condition.    Counts: Correct x 2  Complications: None.  Drains: None.    Complications:  None; patient tolerated the procedure well.    Disposition: PACU - hemodynamically stable.  Condition: stable     [place] Node Biopsy for Breast Cancer  Operation performed with curative intent Yes   Tracer(s) used to identify sentinel nodes in the upfront surgery (non-neoadjuvant) setting Dye and Radioactive tracer   Tracer(s) used to identify sentinel nodes in the neoadjuvant setting N/A   All nodes (colored or non-colored) present at the end of a dye-filled lymphatic channel were removed Yes   All significantly radioactive nodes were removed Yes   All palpably suspicious nodes were removed Yes   Biopsy-proven positive nodes marked with clips prior to chemotherapy were  identified and removed N/A         Task Performed by RNFA or Surgical Assistant:  The surgical assist assisted with positioning, prepping, surgical retraction and closure during the procedure.          Additional Details:     Attending Attestation: I performed the procedure.    Richa Felix  Phone Number: [phone]

## 2024-12-09 NOTE — ANESTHESIA PROCEDURE NOTES
Airway  Date/Time: 12/9/2024 10:56 AM  Urgency: elective    Airway not difficult    Staffing  Performed: CRNA   Authorized by: BIBI Shea-TASHA    Performed by: BIBI Shea-TASHA  Patient location during procedure: OR    Indications and Patient Condition  Indications for airway management: anesthesia  Spontaneous Ventilation: absent  Sedation level: deep  Preoxygenated: yes  Patient position: sniffing  Mask difficulty assessment: 0 - not attempted    Final Airway Details  Final airway type: supraglottic airway      Successful airway: Supraglottic airway: Igel.  Size 4     Number of attempts at approach: 1

## 2024-12-09 NOTE — ANESTHESIA PREPROCEDURE EVALUATION
Patient: Vira Gordon    Procedure Information       Date/Time: 12/09/24 0945    Procedure: Right breast lumpectomy with sentinel lymph node biopsy (SENTINEL) (Right) - PREOP 7:45, NUCLEAR 8:45, SURGERY 9:45  1.5 hours, 10 am    Location: [facility] 01 / Virtual [facility]    Surgeons: Richa Felix MD            Relevant Problems   Anesthesia (within normal limits)      GYN   (+) Malignant neoplasm of upper-outer quadrant of right breast in female, estrogen receptor positive       Clinical information reviewed:   Tobacco  Allergies  Meds   Med Hx  Surg Hx  OB Status  Fam Hx  Soc   Hx        NPO Detail:  NPO/Void Status  Date of Last Liquid: 12/09/24  Time of Last Liquid: 0600  Date of Last Solid: 12/08/24  Time of Last Solid: 2100         Physical Exam    Airway  Mallampati: II  TM distance: >3 FB  Neck ROM: full     Cardiovascular - normal exam     Dental   (+) upper dentures     Pulmonary - normal exam     Abdominal - normal exam         Anesthesia Plan    History of general anesthesia?: yes  History of complications of general anesthesia?: no    ASA 2     general     The patient is a current smoker.  Patient was previously instructed to abstain from smoking on day of procedure.  Patient did not smoke on day of procedure.    intravenous induction   Postoperative administration of opioids is intended.  Anesthetic plan and risks discussed with patient.    Plan discussed with CRNA.

## 2024-12-09 NOTE — DISCHARGE INSTRUCTIONS
DISCHARGE INSTRUCTIONS    Patient: Vira Gordon  Surgery Date: 12/9/2024    Age: 69 y.o.   Gender: female  Attending: Richa Felix MD    MRN: [id number]  OR Location: [facility]    PCP: Aurora Delgadillo MD           SURGERY INFORMATION   Procedure performed: Procedure(s):  Right breast lumpectomy with sentinel lymph node biopsy (SENTINEL)   Post-Op diagnosis: Post-op Diagnosis     * Malignant neoplasm of upper-outer quadrant of right breast in female, estrogen receptor positive [C50.411, Z17.0]   Surgeon:    * Richa Felix - Primary     ACTIVITY RESTRICTIONS   1.  Do not engage in sports, heavy work or lifting until cleared by Dr. Felix.    2.  Do not lift/pull/push with your right arm more than 10 pounds for 2 weeks.   3.  Do not lift her right arm above shoulder height until seen in the office.  4.  You may drive when off of narcotic pain medication.  5.  Continue wearing the compression stockings (JENNIFER hose) until you are walking at least 3 times per day.    BATHING   You may shower starting the day after your surgery.  You may use the Hibiclens soap (the liquid soap you used prior to surgery) at your surgical sites.  However, do not use this soap more than 3 times per week.    WOUND CARE / DRAIN CARE   Remove the gauze/tape dressing before your shower.  Pat dry the Steri-Strips after your shower.  You do NOT need to cover the Steri-Strips after your shower unless there is bleeding or drainage.  2.   Allow the Steri-Strips to fall off on their own.  3.   Apply ice to your surgical area for 20 minutes 3 times a day to help with pain and swelling.  4.   Wear your pink postoperative bra, or other sports bra, at all times (even when sleeping) for the next 2 weeks.  You may remove the brought to shower.    MEDICATIONS   A narcotic pain medication has been prescribed.  Use this medication only AS NEEDED for severe pain.  2.   You may use Tylenol, or ibuprofen, in addition to, or instead of, your  Faxed refill request from:Patient call   Medication request: ondansetron (ZOFRAN-ODT) 4 MG ODT tab  Sig: Take 1 tablet by mouth 2 times daily as needed  Prescription written: 8/26/2020  Last filled: 8/26/2020  last Qty:   Pt's last office visit:   Next scheduled office visit:     Prescription sent to pharmacy by provider.  Pharmacy will contact patient when ready for .     narcotic pain medication.  3.   Resume all home medications unless previously discussed with your surgeon. Blood thinners can be restarted the day after your surgery unless there is significant bleeding.    DIET   Diet as tolerated.   For the first 24 hours after surgery, it is recommended that you eat light meals.  Some nausea and vomiting is common for the first 24 hours after surgery.  Drink plenty of fluids.  Minimize your use of caffeinated beverages.    CALLL YOUR SURGEON IF:   Any evidence of infection at your sugical site which can include redness or drainage. Some clear or pinkish drainage is normal for a few days following surgery.  2.   Excessive bleeding from your surgical site. If there is a small amount of bleeding, apply pressure for 20 minutes, then recheck the wound. If the bleeding does not stop, please call your surgeon's office.  3.   Some nausea and vomiting is expected for the first 24 hours after surgery. If you are unable to keep down fluids, or the nausea/vomiting continues beyond 24 hours.  4.   If you are unable to urinate within 8-12 hours after discharge from the hospital.  5.   A low-grade fever after surgery is normal. Notify the office if your temperature goes above 101 degrees.  6.   Any other concerns or questions you have regarding your surgery.      Richa Felix MD, FACS   [facility]  [address];   [address]  [place]  [address] [phone]

## 2024-12-09 NOTE — ANESTHESIA POSTPROCEDURE EVALUATION
Patient: Vira Gordon    Procedure Summary       Date: 12/09/24 Room / Location: [facility] 01 / [facility]    Anesthesia Start: 1051 Anesthesia Stop: 1321    Procedure: Right breast lumpectomy with sentinel lymph node biopsy (SENTINEL) (Right) Diagnosis:       Malignant neoplasm of upper-outer quadrant of right breast in female, estrogen receptor positive      (Malignant neoplasm of upper-outer quadrant of right breast in female, estrogen receptor positive [C50.411, Z17.0])    Surgeons: Richa Felix MD Responsible Provider: DANG Shea    Anesthesia Type: general ASA Status: 2            Anesthesia Type: general    Vitals Value Taken Time   /63 12/09/24 1430   Temp 36.3 °C (97.3 °F) 12/09/24 1400   Pulse 63 12/09/24 1430   Resp 10 12/09/24 1430   SpO2 96 % 12/09/24 1430       Anesthesia Post Evaluation    Patient location during evaluation: PACU  Patient participation: complete - patient participated  Level of consciousness: awake  Pain score: 5  Pain management: adequate  Airway patency: patent  Cardiovascular status: acceptable  Respiratory status: acceptable  Hydration status: acceptable  Postoperative Nausea and Vomiting: none    No notable events documented.

## 2024-12-10 ASSESSMENT — PAIN SCALES - GENERAL: PAINLEVEL_OUTOF10: 6

## 2024-12-18 ENCOUNTER — HOSPITAL ENCOUNTER (OUTPATIENT)
Dept: RADIATION ONCOLOGY | Facility: CLINIC | Age: 69
Setting detail: RADIATION/ONCOLOGY SERIES
Discharge: HOME | End: 2024-12-18
Payer: COMMERCIAL

## 2024-12-18 VITALS
OXYGEN SATURATION: 96 % | SYSTOLIC BLOOD PRESSURE: 150 MMHG | DIASTOLIC BLOOD PRESSURE: 92 MMHG | TEMPERATURE: 97.9 F | HEART RATE: 74 BPM | WEIGHT: 228.6 LBS | RESPIRATION RATE: 16 BRPM | BODY MASS INDEX: 41.81 KG/M2

## 2024-12-18 DIAGNOSIS — C50.411 MALIGNANT NEOPLASM OF UPPER-OUTER QUADRANT OF RIGHT BREAST IN FEMALE, ESTROGEN RECEPTOR POSITIVE: Primary | ICD-10-CM

## 2024-12-18 DIAGNOSIS — Z17.0 MALIGNANT NEOPLASM OF UPPER-OUTER QUADRANT OF RIGHT BREAST IN FEMALE, ESTROGEN RECEPTOR POSITIVE: Primary | ICD-10-CM

## 2024-12-18 PROCEDURE — 99215 OFFICE O/P EST HI 40 MIN: CPT | Performed by: STUDENT IN AN ORGANIZED HEALTH CARE EDUCATION/TRAINING PROGRAM

## 2024-12-18 PROCEDURE — G2211 COMPLEX E/M VISIT ADD ON: HCPCS | Performed by: STUDENT IN AN ORGANIZED HEALTH CARE EDUCATION/TRAINING PROGRAM

## 2024-12-18 PROCEDURE — 99205 OFFICE O/P NEW HI 60 MIN: CPT | Performed by: STUDENT IN AN ORGANIZED HEALTH CARE EDUCATION/TRAINING PROGRAM

## 2024-12-18 ASSESSMENT — ENCOUNTER SYMPTOMS
OCCASIONAL FEELINGS OF UNSTEADINESS: 0
LOSS OF SENSATION IN FEET: 0
DEPRESSION: 0

## 2024-12-18 ASSESSMENT — COLUMBIA-SUICIDE SEVERITY RATING SCALE - C-SSRS
6. HAVE YOU EVER DONE ANYTHING, STARTED TO DO ANYTHING, OR PREPARED TO DO ANYTHING TO END YOUR LIFE?: NO
2. HAVE YOU ACTUALLY HAD ANY THOUGHTS OF KILLING YOURSELF?: NO
1. IN THE PAST MONTH, HAVE YOU WISHED YOU WERE DEAD OR WISHED YOU COULD GO TO SLEEP AND NOT WAKE UP?: NO

## 2024-12-18 ASSESSMENT — PAIN SCALES - GENERAL: PAINLEVEL_OUTOF10: 6

## 2024-12-18 ASSESSMENT — PATIENT HEALTH QUESTIONNAIRE - PHQ9
2. FEELING DOWN, DEPRESSED OR HOPELESS: NOT AT ALL
1. LITTLE INTEREST OR PLEASURE IN DOING THINGS: NOT AT ALL
SUM OF ALL RESPONSES TO PHQ9 QUESTIONS 1 & 2: 0

## 2024-12-18 NOTE — PROGRESS NOTES
Radiation Oncology Outpatient Consult    Patient Name:  Vira Gordon  MRN:  [id number]  :  1955    Referring Provider: Richa Felix MD  Primary Care Provider: Aurora Delgadillo MD  Care Team: Patient Care Team:  Aurora Delgadillo MD as PCP - General (Family Medicine)  Samina Hernandez, RN as Registered Nurse (Radiology)    Date of Service: 2024     SUBJECTIVE  History of Present Illness:  Vira Gordon is a 69 y.o. female who was referred by Richa Felix MD, for a consultation to the [facility] Department of Radiation Oncology.  She is presenting for evaluation and management of stage IB [id number] right breast invasive ductal carcinoma (grade 2, ER+/AR+/HER2-) s/p right lumpectomy/SLNB 24. Final pathology is still pending.    The patient had a screening lung CT on 24 showing a 2.5 cm right breast mass without significant adenopathy. There were no suspicious lung nodules.    Diagnostic mammogram and ultrasound on 10/8/24 showed a right breast lobulated mass at 12:00, 3 cm from the nipple, measuring up to 3.1 cm (BI-RADS 5). There was a normal-appearing right axillary node on MLO mammogram and was not seen on ultrasound.    Core biopsy of the right breast mass on 10/16/24 showed invasive ductal carcinoma, grade 2, ER>95%/AR 90%/HER2 0.    Breast MRI on 10/31/24 showed the biopsy-proven right breast cnacer measuring up to 3.1 cm. There was associated skin retraction without extension to the skin surface or chest wall. There was no nipple involvement. There was no axillary or internal mammary adenopathy.    Dr. Felix took her to the OR on 24 for right lumpectomy/SLNB. Pathology is still pending.    Today, she presents by herself to discuss radiation. She is still healing from her surgery and has the lumpectomy and SLNB incisions covered with bandages. She has swelling/pain but is dealing with these ok. She is off work but continues to complete all  ADLs at home.    PMH: HLD, arthritis  GynHx: , menarche at 14, first birth at 20, menopause in her mid-50s.   PSH: right knee replacement, left ankle surgery, hysterectomy for cervical lesion (one ovary remaining)  FMH: sister with lung cancer. No family history of breast or ovarian cancer.  SHx: Lives alone in [place], [place]. Works part-time at [employer], currently on medical leave. Current smoker with at least 40 pack-year history; she is trying to quit and has cut down from up to 2 ppd down to 0.5 ppd. Drinks alcohol occasionally.    Prior Radiotherapy:  No radiation treatments to show. (Treatments may have been administered in another system.)       Past Medical History:    Past Medical History:   Diagnosis Date    Hyperlipidemia         Past Surgical History:    Past Surgical History:   Procedure Laterality Date    ANKLE Left     BREAST LUMPECTOMY Right 2024    Right partial mastectomy with sentinel lymph node biopsy    HYSTERECTOMY      TOTAL KNEE ARTHROPLASTY Right 2023        Family History:  Cancer-related family history includes Lung cancer in her sister.    Social History:    Social History     Tobacco Use    Smoking status: Every Day     Types: Cigarettes    Smokeless tobacco: Never   Vaping Use    Vaping status: Former   Substance Use Topics    Alcohol use: Yes     Comment: ocassionally    Drug use: Yes     Types: Marijuana     Comment: over a week ago       Allergies:    Allergies   Allergen Reactions    Darvocet A500 [Propoxyphene N-Acetaminophen] Other        Medications:    Current Outpatient Medications:     atorvastatin (Lipitor) 20 mg tablet, Take 1 tablet (20 mg) by mouth once daily., Disp: , Rfl:     buPROPion XL (Wellbutrin XL) 150 mg 24 hr tablet, Take 1 tablet (150 mg) by mouth once daily in the morning., Disp: , Rfl:     celecoxib (CeleBREX) 100 mg capsule, Take 1 capsule (100 mg) by mouth 2 times a day., Disp: , Rfl:     HYDROcodone-acetaminophen (Norco) 7.5-325 mg tablet, Take 1  tablet by mouth every 6 hours if needed for severe pain (7 - 10)., Disp: 10 tablet, Rfl: 0    meloxicam (Mobic) 7.5 mg tablet, 1 tablet (7.5 mg)., Disp: , Rfl:       Review of Systems:  Review of Systems   All other systems reviewed and are negative.      Performance Status:  The Karnofsky performance scale today is 90, Able to carry on normal activity; minor signs or symptoms of disease (ECOG equivalent 0).        OBJECTIVE  BP (!) 150/92   Pulse 74   Temp 36.6 °C (97.9 °F) (Temporal)   Resp 16   Wt 104 kg (228 lb 9.6 oz)   SpO2 96%   BMI 41.81 kg/m²    Physical Exam  Constitutional:       General: She is not in acute distress.     Appearance: Normal appearance. She is not toxic-appearing.   HENT:      Head: Normocephalic and atraumatic.      Mouth/Throat:      Mouth: Mucous membranes are moist.      Pharynx: Oropharynx is clear. No oropharyngeal exudate or posterior oropharyngeal erythema.   Eyes:      General: No scleral icterus.     Extraocular Movements: Extraocular movements intact.      Conjunctiva/sclera: Conjunctivae normal.      Pupils: Pupils are equal, round, and reactive to light.   Pulmonary:      Effort: Pulmonary effort is normal. No respiratory distress.   Chest:   Breasts:     Right: Swelling and tenderness present. No bleeding, inverted nipple, mass, nipple discharge or skin change.      Left: Normal.      Comments: Right superior lumpectomy incision covered with Steri-strips, and right SLNB incision covered with bandage. Right breast swelling and appropriate tenderness surrounding incisions. Methylene blue dye discoloration at inferior right breast.    No suspicious breast masses, skin changes, or nipple discharge.  Musculoskeletal:         General: Normal range of motion.      Cervical back: Normal range of motion and neck supple.      Right lower leg: No edema.      Left lower leg: No edema.   Lymphadenopathy:      Cervical: No cervical adenopathy.      Upper Body:      Right upper body:  No supraclavicular, axillary or pectoral adenopathy.      Left upper body: No supraclavicular, axillary or pectoral adenopathy.   Skin:     General: Skin is warm and dry.      Coloration: Skin is not jaundiced.      Findings: No lesion or rash.   Neurological:      General: No focal deficit present.      Mental Status: She is alert and oriented to person, place, and time.      Cranial Nerves: No cranial nerve deficit.      Sensory: No sensory deficit.      Motor: No weakness.      Coordination: Coordination normal.      Gait: Gait normal.   Psychiatric:         Mood and Affect: Mood normal.         Behavior: Behavior normal.          Laboratory Review:  There are no laboratory contraindications to radiation therapy.    The pertinent lab results were reviewed and discussed with the patient.    Pathology Review:  The pertinent pathology results were reviewed and discussed with the patient.      Imaging:  The pertinent imaging results were reviewed and discussed with the patient.         ASSESSMENT:   Vira Gordon is a 69 y.o. female with stage IB [id number] right breast invasive ductal carcinoma (grade 2, ER+/SD+/HER2-) s/p right lumpectomy/SLNB 12/9/24. Final pathology is still pending.    She is doing well after her surgery and maintains excellent performance status (ECOG 0). She is still healing and has bandages in place. She has follow up with Dr. Felix on 12/24/24.    I discussed the role of postop breast radiation to reduce risk of in-breast recurrence and potentially improve survival. I also discussed the possibility adjuvant chemo, and this benefit could be assessed with Oncotype testing; she does not want to consider chemo at all based on her late 's experience.     I discussed the rationale, benefits, risks, and process of breast radiation therapy with the patient. I discussed the potential side effects, including but not limited to fatigue, dermatitis, breast swelling, cardiac toxicity, rib fracture,  breast fibrosis, secondary malignancy, etc. The patient expressed understanding and gave informed consent.     We will await for her to progress with her healing and for the final pathology report before scheduling her for CT simulation.    NCCN Guidelines were applicable to guide this patients treatment plan.     Benjamín Verdin MD       Addendum 1/22/25  Final pathology from the right lumpectomy/SLNB on 12/9/24 shows 3.8 cm of grade 2 invasive ductal carcinoma and 3.8 cm of grade DCIS, with no LVI and widely negative margins (>4 mm). Three sentinel nodes were taken and all were negative for carcinoma. Her pathologic stage is pT2 pN0.    Dr. Felix saw her for follow up and wound check today, and she feels the wound will be fully healed in the next 2 weeks. We will schedule her for CT simulation next week and ensure complete wound healing before start of breast radiation.

## 2024-12-18 NOTE — PROGRESS NOTES
Radiation Oncology Nursing Note    Prior Radiotherapy:  No  No radiation treatments to show. (Treatments may have been administered in another system.)     Current Systemic Treatment:  No     Presence of Pacemaker or ICD:  No    History of Autoimmune or Connective Tissue Disorders:  No    Pain: The patient's current pain level was assessed.  They report currently having a pain of 6 out of 10.  They feel their pain is under control with the use of pain medications. Patient complain of soreness under her right axilla from surgery on 12/9.    Review of Systems:  Review of Systems - Oncology

## 2024-12-24 ENCOUNTER — APPOINTMENT (OUTPATIENT)
Facility: CLINIC | Age: 69
End: 2024-12-24
Payer: COMMERCIAL

## 2024-12-24 VITALS
BODY MASS INDEX: 42.21 KG/M2 | WEIGHT: 229.4 LBS | OXYGEN SATURATION: 91 % | HEART RATE: 89 BPM | SYSTOLIC BLOOD PRESSURE: 128 MMHG | HEIGHT: 62 IN | DIASTOLIC BLOOD PRESSURE: 84 MMHG

## 2024-12-24 DIAGNOSIS — N63.11 MASS OF UPPER OUTER QUADRANT OF RIGHT BREAST: ICD-10-CM

## 2024-12-24 DIAGNOSIS — Z17.0 MALIGNANT NEOPLASM OF UPPER-OUTER QUADRANT OF RIGHT BREAST IN FEMALE, ESTROGEN RECEPTOR POSITIVE: Primary | ICD-10-CM

## 2024-12-24 DIAGNOSIS — C50.411 MALIGNANT NEOPLASM OF UPPER-OUTER QUADRANT OF RIGHT BREAST IN FEMALE, ESTROGEN RECEPTOR POSITIVE: Primary | ICD-10-CM

## 2024-12-24 LAB
LAB AP BLOCK FOR ADDITIONAL STUDIES: NORMAL
LABORATORY COMMENT REPORT: NORMAL
PATH REPORT.FINAL DX SPEC: NORMAL
PATH REPORT.GROSS SPEC: NORMAL
PATH REPORT.RELEVANT HX SPEC: NORMAL
PATH REPORT.TOTAL CANCER: NORMAL
PATHOLOGY SYNOPTIC REPORT: NORMAL

## 2024-12-24 PROCEDURE — 1159F MED LIST DOCD IN RCRD: CPT | Performed by: SURGERY

## 2024-12-24 PROCEDURE — 1160F RVW MEDS BY RX/DR IN RCRD: CPT | Performed by: SURGERY

## 2024-12-24 PROCEDURE — 99024 POSTOP FOLLOW-UP VISIT: CPT | Performed by: SURGERY

## 2024-12-24 PROCEDURE — 3008F BODY MASS INDEX DOCD: CPT | Performed by: SURGERY

## 2024-12-24 ASSESSMENT — ENCOUNTER SYMPTOMS
COUGH: 1
NAUSEA: 0
CONSTIPATION: 0
WEAKNESS: 0
EYE REDNESS: 0
VOMITING: 0
WOUND: 0
FEVER: 0
CHILLS: 0
ABDOMINAL PAIN: 0
FREQUENCY: 0
MYALGIAS: 0
EYE PAIN: 0
SHORTNESS OF BREATH: 0
HEADACHES: 0
CONFUSION: 0
ARTHRALGIAS: 1
BRUISES/BLEEDS EASILY: 0
DIARRHEA: 0
HEMATURIA: 0
POLYPHAGIA: 0
AGITATION: 0
FATIGUE: 0
DYSURIA: 0
SPEECH DIFFICULTY: 0
FLANK PAIN: 0

## 2024-12-24 NOTE — PROGRESS NOTES
GENERAL SURGERY OFFICE NOTE    Patient: Vira Gordon    Age: 69 y.o.   Gender: female    MRN: [id number]    PCP: Aurora Delgadillo MD        SUBJECTIVE     Chief Complaint  Follow-up (Patient is here for a 2 week post op Right breast lumpectomy with sentinel lymph node biopsy. Patient states that she has shooting pain on the side of her breast. Patient states that she has some redness and itching. Patient denies any fevers. Patient states that the area is warm to the touch at times. )       JAVAD Constantino returns to the office for a 2-week postop check after undergoing a right breast lumpectomy (palpable mass) and sentinel lymph node biopsy surgery.  At the time of her surgery, 3 sentinel lymph nodes were removed.  Since surgery, she has had a moderate amount of pain.  She used all 10 of the Norco tablets and some additional Tylenol.  She did go back on her meloxicam which she uses chronically.  She has not had any bleeding or drainage at the surgical sites, but does complain of some significant pruritus.  She is back to eating and drinking well without any nausea or vomiting.  No fevers.  She did have an appointment with radiation oncology who was awaiting her surgical pathology results before proceeding with treatment.  She just took off today the gauze/tape dressing which was applied at the time of surgery.    Risk factors for breast cancer: 69-year-old white female; menarche at age 14; first live birth at age 20; no previous breast biopsy; no family history of breast cancer.  No family history of ovarian/pancreatic/prostate cancer.  She has a sister who had tobacco related lung cancer.  She went through menopause around her mid 50s, never took any hormone replacement therapy.  This gives her a 5-year Antonia score of 1.2% and a lifetime risk of 4.2% which puts her in a less than average risk category.    ROS  Review of Systems   Constitutional:  Negative for chills, fatigue and fever.   HENT:  Negative for  congestion, ear pain and hearing loss.    Eyes:  Negative for pain and redness.   Respiratory:  Positive for cough. Negative for shortness of breath.    Cardiovascular:  Negative for chest pain and leg swelling.   Gastrointestinal:  Negative for abdominal pain, constipation, diarrhea, nausea and vomiting.   Endocrine: Negative for polyphagia.   Genitourinary:  Negative for dysuria, flank pain, frequency and hematuria.   Musculoskeletal:  Positive for arthralgias. Negative for myalgias.   Skin:  Negative for rash and wound.   Allergic/Immunologic: Negative for immunocompromised state.   Neurological:  Negative for speech difficulty, weakness and headaches.   Hematological:  Does not bruise/bleed easily.   Psychiatric/Behavioral:  Negative for agitation and confusion.           HISTORY     Past Medical History:   Diagnosis Date    Hyperlipidemia     Mass of upper outer quadrant of right breast 10/15/2024    12 o'clock; 3cm from nipple (palpable)          Past Surgical History:   Procedure Laterality Date    ANKLE Left     BREAST LUMPECTOMY Right 12/09/2024    Right partial mastectomy with sentinel lymph node biopsy    HYSTERECTOMY      TOTAL KNEE ARTHROPLASTY Right 01/2023        Family History   Problem Relation Name Age of Onset    Hypertension Mother      Diabetes Mother      Hypertension Father      Lung cancer Sister          Allergies   Allergen Reactions    Darvocet A500 [Propoxyphene N-Acetaminophen] Other        Social History     Tobacco Use   Smoking Status Every Day    Types: Cigarettes   Smokeless Tobacco Never        Social History     Substance and Sexual Activity   Alcohol Use Yes    Comment: ocassionally        HOME MEDICATIONS  Current Outpatient Medications   Medication Instructions    atorvastatin (Lipitor) 20 mg tablet 1 tablet, Daily    buPROPion XL (Wellbutrin XL) 150 mg 24 hr tablet 1 tablet, Every morning    celecoxib (CELEBREX) 100 mg, 2 times daily    meloxicam (MOBIC) 7.5 mg     "      OBJECTIVE   Last Recorded Vitals.  Blood pressure 128/84, pulse 89, height 1.575 m (5' 2\"), weight 104 kg (229 lb 6.4 oz), SpO2 91%.     PHYSICAL EXAM  Physical Exam   Phone visit/previous exam:  General: Well-developed, well-nourished and in no acute distress.  Head: Normocephalic. Atraumatic.  Neck/thyroid: Neck is supple.   Eyes: Pupils equal round and reactive to light. Conjunctiva normal.  ENMT: No masses or deformity of external nose. External ears without masses.  Respiratory/Chest:  Normal respiratory effort.  Breast: Moderate to large, symmetrical breasts.  No palpable abnormality of the left breast.  On the right breast, just above the areola, the surgical incision is healing well.  There is some redness consistent with induration from the methylene blue injection.  There still is a moderate amount of methylene blue staining on the lateral aspect of the breast.  No cellulitis.  No drainage.  Moderate swelling.  Lymphatics: No palpable lymphadenopathy of the cervical, supraclavicular or axillary regions.  Right axillary incision healing well with just a small amount of skin irritation.  Cardiovascular: Regular rate and rhythm.   Abdomen: Soft, nontender, nondistended.   Musculoskeletal: Joints and limbs are grossly normal. Normal gait. Normal range of motion of major joints.  Neuro: Oriented to person, place and time. No obvious neurological deficit. Motor strength grossly normal.  Psych: Normal mood and affect.    RESULTS   Labs  Surgical Pathology Exam: [id number]  Order: [id number]   Collected 10/16/2024 12:12       Status: Final result       Visible to patient: No (inaccessible in [facility])    0 Result Notes       Component  Resulting Agency   FINAL DIAGNOSIS   A. 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     Note:  In this limited sample, the invasive carcinoma measures up to 7 " mm in greatest dimension.  ER, HI and HER2 will be reported in a synoptic report.    Electronically signed by Rachid Lentz MD on 10/22/2024 at [facility]      [facility]   By the signature on this report, the individual or group listed as making the Final Interpretation/Diagnosis certifies that they have reviewed this case.    Case Summary Report   Breast Biomarker Reporting Template   Protocol posted: 12/13/2023BREAST BIOMARKER REPORTING TEMPLATE - A  Test(s) Performed     Estrogen Receptor (ER) Status  Positive (greater than 10% of cells demonstrate nuclear positivity)   Percentage of Cells with Nuclear Positivity  >95 %   Average Intensity of Staining  Strong   Test Type  Food and Drug Administration (FDA) cleared (test / vendor): Roche CONFIRM anti-(ER) (SP1) Rabbit Monoclonal Primary Antibody   Primary Antibody  SP1   Scoring System  No separate scoring system used   Test(s) Performed     Progesterone Receptor (PgR) Status  Positive   Percentage of Cells with Nuclear Positivity  90 %   Average Intensity of Staining  Strong   Test Type  Food and Drug Administration (FDA) cleared (test / vendor): Roche CONFIRM anti-(HI) (1E2) Rabbit Monoclonal Primary Anitbody   Primary Antibody  1E2   Scoring System  No separate scoring system used   Test(s) Performed     HER2 by Immunohistochemistry  Negative (Score 0)   Test Type  Food and Drug Administration (FDA) cleared (test / vendor): Roche Pathway anti-HER-2/sabrina (4B5) Rabbit Monoclonal Primary Antibody             Radiology Resutls  mammo bilateral diagnostic tomosynthesis  BI US breast limited right  Status: Final result     PACS Images     Show images for BI mammo bilateral diagnostic tomosynthesis  Signed by    Signed Time Phone Pager   Edwin De MD 10/08/2024 12:52 [phone]     Exam Information    Status Exam Begun Exam Ended   Final 10/08/2024 11:18 10/08/2024 11:45     Study Result    Narrative & Impression   Interpreted By:  Edwin De,    STUDY:  BI MAMMO BILATERAL DIAGNOSTIC TOMOSYNTHESIS; BI US BREAST LIMITED  RIGHT;  10/8/2024 11:45 am; 10/8/2024 12:10 pm      ACCESSION NUMBER(S):  [id number]; [id number]      ORDERING CLINICIAN:  MURTAZA GERBER      INDICATION:  Palpable abnormality right breast      ,N63.10 Unspecified lump in the right breast, unspecified  quadrant,N63.15 Unspecified lump in the right breast, overlapping  quadrants      COMPARISON:  No prior examination available for comparison. If a prior examination  becomes available, this examination will be compared to the prior  study. Addendum report will be issued.      FINDINGS:  MAMMOGRAPHY: 2D and tomosynthesis images were reviewed at 1 mm slice  thickness.      Density:  The breasts are almost entirely fatty.      Right breast: 3.3 cm spiculated mass identified of the 12 o'clock  middle depth worrisome for malignancy. Prominent albeit small lymph  node of the posterior depth right breast MLO projection.      Left breast: Normal fibroglandular tissue. No findings worrisome for  malignancy.      ULTRASOUND: Targeted ultrasound was performed of the right breast by  a registered sonographer with elastography.      Right breast 12 o'clock position 3 cm from nipple demonstrates a  lobulated mass with posterior acoustic shadowing measuring up to 3.1  cm in greatest dimension. Elastography stiff.      Right axilla demonstrates a normal lymph node. The 5 mm lymph node of  the posterior depth right breast only seen on MLO projection  mammography is not sonographically apparent on this examination.      IMPRESSION:  Large right breast mass favoring malignancy.      Left breast is normal.      BI-RADS CATEGORY:  BI-RADS CATEGORY:  5 Highly Suggestive of Malignancy.  Recommendation:  Surgical Consultation and Biopsy.  Recommended Date:  Immediate.  Laterality:  Right.      Ultrasound-guided biopsy right breast recommended. Surgical  consultation with history and physical required prior  to biopsy.     MR breast bilateral w contrast full protocol  Status: Final result     PACS Images     Show images for MR breast bilateral w contrast full protocol  Signed by    Signed Time Phone Pager   Em Rosales MD 11/04/2024 09:03 [phone]     Exam Information    Status Exam Begun Exam Ended   Final 10/31/2024 14:24 10/31/2024 15:02     Study Result    Narrative & Impression   Interpreted By:  Em Rosales and Marshall Colin   STUDY:  BI MR BREAST BILATERAL WITH CONTRAST FULL PROTOCOL;  10/31/2024 3:02  pm      ACCESSION NUMBER(S):  [id number]      ORDERING CLINICIAN:  JILLIAN SHEPPARD      INDICATION:  Recently diagnosed invasive ductal carcinoma of the right breast.      ,C50.411 Malignant neoplasm of upper-outer quadrant of right female  breast,Z17.0 Estrogen receptor positive status (ER+)      COMPARISON:  Mammogram and ultrasound dated 10/08/2024 and 10/16/2024.      TECHNIQUE:  Using a dedicated breast coil, STIR axial and T1-weighted fat  saturation axial images of the breasts were obtained, the latter both  before and after intravenous administration of Gadolinium DTPA. On an  independent workstation, 3-D images were formulated using Ziptask  including time enhancement curves, subtraction images and MIP images.      Intravenous contrast: 20 ML of Dotarem      FINDINGS:  Density: Almost entirely fat.      There is symmetric mild bilateral background enhancement.      RIGHT BREAST:  Within the superior central quadrant at anterior to  middle depth, there is an irregular spiculated rim enhancing mass  measuring 3.1 x 2.6 x 2.8 cm (series 502, image 105) with an internal  focus of susceptibility artifact corresponding to the biopsy tissue  marker compatible with patient's biopsy-proven right breast cancer.  There is associated skin retraction without evidence of extent to the  skin surface or chest wall. There is no evidence of nipple  involvement. No additional abnormal enhancing  masses or non-mass  enhancement.      No axillary or internal mammary lymphadenopathy is appreciated.      LEFT BREAST:  No suspicious mass or nonmass enhancement is identified.      No axillary or internal mammary lymphadenopathy is appreciated.      NON-BREAST FINDINGS:  None.      IMPRESSION:  1. Biopsy-proven right breast cancer as described above. Surgical  consultation is recommended.  2. No MRI evidence of malignancy in the left breast.  3. No axillary or internal mammary lymphadenopathy.      BI-RADS CATEGORY:  BI-RADS CATEGORY:  6 Known Biopsy-Proven Malignancy.  Recommendation:  Immediate Follow-up.  Recommended Date:  Immediate.  Laterality:  Right.      For any future breast imaging appointments, please call [phone]).      I personally reviewed the images/study and I agree with the breast  imaging fellow, Brendan Rosales D.O., findings as stated. This study  was interpreted at [facility],  [place], [place].      MACRO:  None      Signed by: Em Rosales 11/4/2024 9:03 AM  Dictation workstation:   [id number]     PATHOLOGY  Surgical pathology is still pending.    ASSESSMENT / PLAN   Diagnoses and all orders for this visit:  Malignant neoplasm of upper-outer quadrant of right breast in female, estrogen receptor positive  Mass of upper outer quadrant of right breast            Plan  Oct 2024: RIGHT: palp 3.3cm mass at 12 o'clock, 3cm from nipple; IDC; gr2; >95/90/neg    1.  Pathology of the core needle biopsy of the palpable right breast mass confirmed invasive ductal carcinoma.  The size of the mass is approximately 3.3 cm by radiographic evaluation which would make this a T2 lesion.  There is some concern for possible overlying skin involvement as there is some dimpling in the skin just above the areolar region.  The MRI confirmed a 3.1 cm area of concern.  There does seem to be some retraction of the overlying skin, but no obvious involvement of cancer  within the skin itself.  Radiographically, lymph nodes are negative.  Surgical treatment was undertaken with a right lumpectomy (palpable mass) and sentinel lymph node biopsy.  Surgical pathology is pending.  2.  She still has a significant amount of methylene blue staining of the lateral aspect of the breast.  The redness that she is seen on the breast is due to the methylene blue irritation.  There is no evidence of any cellulitis or infection.  She may start applying lotion to the surgical sites to help with the itching.  She will return to the office in 2 weeks for wound check to review pathology and check the wound before starting XRT.  3.  Her cancer is ER/KY positive.  Will plan for referral to medical oncology postoperatively for endocrine therapy.  Can make this referral at her next office appointment.  4.  She has been seen by radiation oncology.  Holding on planning CT until pathology returns.  5.  She does not have any family history of breast cancer; therefore, does not meet criteria for referral to genetics.  6.  Since she is still under activity restrictions due to the axillary incision for at least 2 more weeks, she will require a minimum of 2 more weeks out of work.    Richa Felix MD, FACS  [facility] General Surgery  [address] [place] [address];   [address]  [place]  [address] [phone]

## 2024-12-24 NOTE — PATIENT INSTRUCTIONS
1.  You may apply lotion to your surgical site to help with dryness and itching.  2.  Allow the Steri-Strips on the breast to fall off on its own.  3.  You may continue to shower, but no tub bath.  4.  Limit range of motion of right arm to shoulder height only.  Limit reaching above your head till you are seen back in the office for routine postoperative care.  5.  Return to Dr. Felix's office in 2 weeks for wound check.

## 2025-01-06 ENCOUNTER — PHARMACY VISIT (OUTPATIENT)
Dept: PHARMACY | Facility: CLINIC | Age: 70
End: 2025-01-06
Payer: MEDICARE

## 2025-01-06 ENCOUNTER — HOSPITAL ENCOUNTER (EMERGENCY)
Facility: HOSPITAL | Age: 70
Discharge: HOME | End: 2025-01-06
Attending: STUDENT IN AN ORGANIZED HEALTH CARE EDUCATION/TRAINING PROGRAM
Payer: COMMERCIAL

## 2025-01-06 VITALS
TEMPERATURE: 98.2 F | BODY MASS INDEX: 42.33 KG/M2 | RESPIRATION RATE: 12 BRPM | HEART RATE: 65 BPM | DIASTOLIC BLOOD PRESSURE: 100 MMHG | OXYGEN SATURATION: 97 % | SYSTOLIC BLOOD PRESSURE: 145 MMHG | HEIGHT: 62 IN | WEIGHT: 230 LBS

## 2025-01-06 DIAGNOSIS — T81.42XA INFECTION OF DEEP INCISIONAL SURGICAL SITE AFTER PROCEDURE, INITIAL ENCOUNTER: Primary | ICD-10-CM

## 2025-01-06 LAB
ALBUMIN SERPL BCP-MCNC: 4.1 G/DL (ref 3.4–5)
ALP SERPL-CCNC: 78 U/L (ref 33–136)
ALT SERPL W P-5'-P-CCNC: 18 U/L (ref 7–45)
ANION GAP SERPL CALC-SCNC: 11 MMOL/L (ref 10–20)
AST SERPL W P-5'-P-CCNC: 13 U/L (ref 9–39)
BASOPHILS # BLD AUTO: 0.05 X10*3/UL (ref 0–0.1)
BASOPHILS NFR BLD AUTO: 0.7 %
BILIRUB SERPL-MCNC: 0.5 MG/DL (ref 0–1.2)
BUN SERPL-MCNC: 16 MG/DL (ref 6–23)
CALCIUM SERPL-MCNC: 9.2 MG/DL (ref 8.6–10.3)
CHLORIDE SERPL-SCNC: 104 MMOL/L (ref 98–107)
CO2 SERPL-SCNC: 25 MMOL/L (ref 21–32)
CREAT SERPL-MCNC: 0.79 MG/DL (ref 0.5–1.05)
EGFRCR SERPLBLD CKD-EPI 2021: 82 ML/MIN/1.73M*2
EOSINOPHIL # BLD AUTO: 0.25 X10*3/UL (ref 0–0.7)
EOSINOPHIL NFR BLD AUTO: 3.5 %
ERYTHROCYTE [DISTWIDTH] IN BLOOD BY AUTOMATED COUNT: 12.7 % (ref 11.5–14.5)
GLUCOSE SERPL-MCNC: 113 MG/DL (ref 74–99)
HCT VFR BLD AUTO: 42.8 % (ref 36–46)
HGB BLD-MCNC: 14.2 G/DL (ref 12–16)
IMM GRANULOCYTES # BLD AUTO: 0.01 X10*3/UL (ref 0–0.7)
IMM GRANULOCYTES NFR BLD AUTO: 0.1 % (ref 0–0.9)
LACTATE SERPL-SCNC: 1.1 MMOL/L (ref 0.4–2)
LYMPHOCYTES # BLD AUTO: 1.87 X10*3/UL (ref 1.2–4.8)
LYMPHOCYTES NFR BLD AUTO: 26.3 %
MCH RBC QN AUTO: 30 PG (ref 26–34)
MCHC RBC AUTO-ENTMCNC: 33.2 G/DL (ref 32–36)
MCV RBC AUTO: 91 FL (ref 80–100)
MONOCYTES # BLD AUTO: 0.45 X10*3/UL (ref 0.1–1)
MONOCYTES NFR BLD AUTO: 6.3 %
NEUTROPHILS # BLD AUTO: 4.47 X10*3/UL (ref 1.2–7.7)
NEUTROPHILS NFR BLD AUTO: 63.1 %
NRBC BLD-RTO: 0 /100 WBCS (ref 0–0)
PLATELET # BLD AUTO: 262 X10*3/UL (ref 150–450)
POTASSIUM SERPL-SCNC: 4 MMOL/L (ref 3.5–5.3)
PROT SERPL-MCNC: 7.4 G/DL (ref 6.4–8.2)
RBC # BLD AUTO: 4.73 X10*6/UL (ref 4–5.2)
SODIUM SERPL-SCNC: 136 MMOL/L (ref 136–145)
WBC # BLD AUTO: 7.1 X10*3/UL (ref 4.4–11.3)

## 2025-01-06 PROCEDURE — 87077 CULTURE AEROBIC IDENTIFY: CPT | Mod: PORLAB | Performed by: STUDENT IN AN ORGANIZED HEALTH CARE EDUCATION/TRAINING PROGRAM

## 2025-01-06 PROCEDURE — 36415 COLL VENOUS BLD VENIPUNCTURE: CPT | Performed by: STUDENT IN AN ORGANIZED HEALTH CARE EDUCATION/TRAINING PROGRAM

## 2025-01-06 PROCEDURE — 99284 EMERGENCY DEPT VISIT MOD MDM: CPT

## 2025-01-06 PROCEDURE — 85025 COMPLETE CBC W/AUTO DIFF WBC: CPT | Performed by: STUDENT IN AN ORGANIZED HEALTH CARE EDUCATION/TRAINING PROGRAM

## 2025-01-06 PROCEDURE — 2500000002 HC RX 250 W HCPCS SELF ADMINISTERED DRUGS (ALT 637 FOR MEDICARE OP, ALT 636 FOR OP/ED): Performed by: STUDENT IN AN ORGANIZED HEALTH CARE EDUCATION/TRAINING PROGRAM

## 2025-01-06 PROCEDURE — 84075 ASSAY ALKALINE PHOSPHATASE: CPT | Performed by: STUDENT IN AN ORGANIZED HEALTH CARE EDUCATION/TRAINING PROGRAM

## 2025-01-06 PROCEDURE — 2500000001 HC RX 250 WO HCPCS SELF ADMINISTERED DRUGS (ALT 637 FOR MEDICARE OP): Performed by: STUDENT IN AN ORGANIZED HEALTH CARE EDUCATION/TRAINING PROGRAM

## 2025-01-06 PROCEDURE — RXMED WILLOW AMBULATORY MEDICATION CHARGE

## 2025-01-06 PROCEDURE — 83605 ASSAY OF LACTIC ACID: CPT | Performed by: STUDENT IN AN ORGANIZED HEALTH CARE EDUCATION/TRAINING PROGRAM

## 2025-01-06 PROCEDURE — 99283 EMERGENCY DEPT VISIT LOW MDM: CPT | Performed by: STUDENT IN AN ORGANIZED HEALTH CARE EDUCATION/TRAINING PROGRAM

## 2025-01-06 PROCEDURE — 99024 POSTOP FOLLOW-UP VISIT: CPT | Performed by: SURGERY

## 2025-01-06 RX ORDER — SULFAMETHOXAZOLE AND TRIMETHOPRIM 800; 160 MG/1; MG/1
1 TABLET ORAL ONCE
Status: COMPLETED | OUTPATIENT
Start: 2025-01-06 | End: 2025-01-06

## 2025-01-06 RX ORDER — CEPHALEXIN 250 MG/1
500 CAPSULE ORAL ONCE
Status: COMPLETED | OUTPATIENT
Start: 2025-01-06 | End: 2025-01-06

## 2025-01-06 RX ORDER — SULFAMETHOXAZOLE AND TRIMETHOPRIM 800; 160 MG/1; MG/1
1 TABLET ORAL 2 TIMES DAILY
Qty: 14 TABLET | Refills: 0 | Status: SHIPPED | OUTPATIENT
Start: 2025-01-06 | End: 2025-01-13

## 2025-01-06 RX ORDER — CEPHALEXIN 500 MG/1
500 CAPSULE ORAL 4 TIMES DAILY
Qty: 28 CAPSULE | Refills: 0 | Status: SHIPPED | OUTPATIENT
Start: 2025-01-06 | End: 2025-01-13

## 2025-01-06 RX ADMIN — SULFAMETHOXAZOLE AND TRIMETHOPRIM 1 TABLET: 800; 160 TABLET ORAL at 14:22

## 2025-01-06 RX ADMIN — CEPHALEXIN 500 MG: 250 CAPSULE ORAL at 14:22

## 2025-01-06 ASSESSMENT — COLUMBIA-SUICIDE SEVERITY RATING SCALE - C-SSRS
6. HAVE YOU EVER DONE ANYTHING, STARTED TO DO ANYTHING, OR PREPARED TO DO ANYTHING TO END YOUR LIFE?: NO
1. IN THE PAST MONTH, HAVE YOU WISHED YOU WERE DEAD OR WISHED YOU COULD GO TO SLEEP AND NOT WAKE UP?: NO
2. HAVE YOU ACTUALLY HAD ANY THOUGHTS OF KILLING YOURSELF?: NO

## 2025-01-06 ASSESSMENT — LIFESTYLE VARIABLES
HAVE YOU EVER FELT YOU SHOULD CUT DOWN ON YOUR DRINKING: NO
EVER FELT BAD OR GUILTY ABOUT YOUR DRINKING: NO
HAVE PEOPLE ANNOYED YOU BY CRITICIZING YOUR DRINKING: NO
EVER HAD A DRINK FIRST THING IN THE MORNING TO STEADY YOUR NERVES TO GET RID OF A HANGOVER: NO
TOTAL SCORE: 0

## 2025-01-06 ASSESSMENT — PAIN DESCRIPTION - ORIENTATION: ORIENTATION: RIGHT

## 2025-01-06 ASSESSMENT — PAIN SCALES - GENERAL: PAINLEVEL_OUTOF10: 5 - MODERATE PAIN

## 2025-01-06 ASSESSMENT — ENCOUNTER SYMPTOMS
NAUSEA: 0
FATIGUE: 0
CHEST TIGHTNESS: 0
CONSTIPATION: 0
COLOR CHANGE: 0
VOMITING: 0
HEADACHES: 0
APPETITE CHANGE: 0
DIARRHEA: 0
SHORTNESS OF BREATH: 0
DIZZINESS: 0
PALPITATIONS: 0
ABDOMINAL PAIN: 0
CHILLS: 0
WOUND: 1
FEVER: 0
LIGHT-HEADEDNESS: 0
COUGH: 0

## 2025-01-06 ASSESSMENT — PAIN - FUNCTIONAL ASSESSMENT: PAIN_FUNCTIONAL_ASSESSMENT: 0-10

## 2025-01-06 ASSESSMENT — PAIN DESCRIPTION - PAIN TYPE: TYPE: ACUTE PAIN

## 2025-01-06 ASSESSMENT — PAIN DESCRIPTION - DESCRIPTORS: DESCRIPTORS: BURNING

## 2025-01-06 ASSESSMENT — PAIN DESCRIPTION - LOCATION: LOCATION: BREAST

## 2025-01-06 NOTE — CONSULTS
GENERAL SURGERY CONSULTATION NOTE    Vira Gordon   1955   [id number]     Consults    Reason For Consult  Post operative wound    History Of Present Illness  Vira Gordon is a 69 y.o. female with history of recent R lumpectomy with SLNB on 12/9 presenting with drainage from her incision. She states she saw Dr. Felix at her 2 week post op appointment and everything appeared to be healing well. On Thursday she noticed increased drainage from the lateral portion of her wound. Today she had a rush of drainage and noticed a foul smell so she came in for evaluation. She denies any redness, irritation, tenderness, fevers or chills. General surgwery was consulted for concern of wound dehiscience and drainage.     Past Medical History  Past Medical History:   Diagnosis Date    Hyperlipidemia     Mass of upper outer quadrant of right breast 10/15/2024    12 o'clock; 3cm from nipple (palpable)         Surgical History  Past Surgical History:   Procedure Laterality Date    ANKLE Left     BREAST LUMPECTOMY Right 12/09/2024    Right partial mastectomy with sentinel lymph node biopsy    HYSTERECTOMY      TOTAL KNEE ARTHROPLASTY Right 01/2023       Medications  No current facility-administered medications on file prior to encounter.     Current Outpatient Medications on File Prior to Encounter   Medication Sig Dispense Refill    atorvastatin (Lipitor) 20 mg tablet Take 1 tablet (20 mg) by mouth once daily.      buPROPion XL (Wellbutrin XL) 150 mg 24 hr tablet Take 1 tablet (150 mg) by mouth once daily in the morning.      celecoxib (CeleBREX) 100 mg capsule Take 1 capsule (100 mg) by mouth 2 times a day.      meloxicam (Mobic) 7.5 mg tablet 1 tablet (7.5 mg).         Allergies  Darvocet a500 [propoxyphene n-acetaminophen]     Social History  She reports that she has been smoking cigarettes. She has never used smokeless tobacco. She reports current alcohol use. She reports current drug use. Drug: Marijuana.    Family  "History  Family History   Problem Relation Name Age of Onset    Hypertension Mother      Diabetes Mother      Hypertension Father      Lung cancer Sister          Review of Systems   Constitutional:  Negative for appetite change, chills, fatigue and fever.   Respiratory:  Negative for cough, chest tightness and shortness of breath.    Cardiovascular:  Negative for chest pain and palpitations.   Gastrointestinal:  Negative for abdominal pain, constipation, diarrhea, nausea and vomiting.   Skin:  Positive for wound. Negative for color change and rash.   Neurological:  Negative for dizziness, light-headedness and headaches.       Last Recorded Vitals  Blood pressure (!) 159/98, pulse 71, temperature 36.8 °C (98.2 °F), temperature source Skin, resp. rate 13, height 1.575 m (5' 2\"), weight 104 kg (230 lb), SpO2 96%.     Physical Exam  Constitutional:       General: She is not in acute distress.  HENT:      Head: Normocephalic and atraumatic.      Mouth/Throat:      Mouth: Mucous membranes are moist.      Pharynx: Oropharynx is clear.   Cardiovascular:      Rate and Rhythm: Normal rate.      Pulses: Normal pulses.   Pulmonary:      Effort: Pulmonary effort is normal. No respiratory distress.   Chest:      Chest wall: No tenderness.   Skin:     Capillary Refill: Capillary refill takes less than 2 seconds.      Comments: Right breast lumpectomy scar with lateral 2.5 cm open and draining cloudy serosanguinous drainage, pocket about 2 cm deep, no purulence or fluctuance noted. Wound cleaned and packed wet to dry   Neurological:      Mental Status: She is alert.          Relevant Results:  Labs:  Results for orders placed or performed during the hospital encounter of 01/06/25 (from the past 24 hours)   CBC and Auto Differential   Result Value Ref Range    WBC 7.1 4.4 - 11.3 x10*3/uL    nRBC 0.0 0.0 - 0.0 /100 WBCs    RBC 4.73 4.00 - 5.20 x10*6/uL    Hemoglobin 14.2 12.0 - 16.0 g/dL    Hematocrit 42.8 36.0 - 46.0 %    MCV 91 80 " - 100 fL    MCH 30.0 26.0 - 34.0 pg    MCHC 33.2 32.0 - 36.0 g/dL    RDW 12.7 11.5 - 14.5 %    Platelets 262 150 - 450 x10*3/uL    Neutrophils % 63.1 40.0 - 80.0 %    Immature Granulocytes %, Automated 0.1 0.0 - 0.9 %    Lymphocytes % 26.3 13.0 - 44.0 %    Monocytes % 6.3 2.0 - 10.0 %    Eosinophils % 3.5 0.0 - 6.0 %    Basophils % 0.7 0.0 - 2.0 %    Neutrophils Absolute 4.47 1.20 - 7.70 x10*3/uL    Immature Granulocytes Absolute, Automated 0.01 0.00 - 0.70 x10*3/uL    Lymphocytes Absolute 1.87 1.20 - 4.80 x10*3/uL    Monocytes Absolute 0.45 0.10 - 1.00 x10*3/uL    Eosinophils Absolute 0.25 0.00 - 0.70 x10*3/uL    Basophils Absolute 0.05 0.00 - 0.10 x10*3/uL   Comprehensive metabolic panel   Result Value Ref Range    Glucose 113 (H) 74 - 99 mg/dL    Sodium 136 136 - 145 mmol/L    Potassium 4.0 3.5 - 5.3 mmol/L    Chloride 104 98 - 107 mmol/L    Bicarbonate 25 21 - 32 mmol/L    Anion Gap 11 10 - 20 mmol/L    Urea Nitrogen 16 6 - 23 mg/dL    Creatinine 0.79 0.50 - 1.05 mg/dL    eGFR 82 >60 mL/min/1.73m*2    Calcium 9.2 8.6 - 10.3 mg/dL    Albumin 4.1 3.4 - 5.0 g/dL    Alkaline Phosphatase 78 33 - 136 U/L    Total Protein 7.4 6.4 - 8.2 g/dL    AST 13 9 - 39 U/L    Bilirubin, Total 0.5 0.0 - 1.2 mg/dL    ALT 18 7 - 45 U/L   Lactate   Result Value Ref Range    Lactate 1.1 0.4 - 2.0 mmol/L       Imaging:  NM injection only for sentinel node biopsy  no scan performed  Narrative: Interpreted By:  Thompson Murillo,   STUDY:  NM INJECTION ONLY FOR SENTINEL NODE BIOPSY  NO SCAN PERFORMED;  12/9/2024 9:46 am      INDICATION:  Signs/Symptoms:SLNB surgery 12/9/24.      COMPARISON:  None.      ACCESSION NUMBER(S):  [id number]      ORDERING CLINICIAN:  JILLIAN SHEPPARD      TECHNIQUE:  DIVISION OF NUCLEAR MEDICINE  BREAST SENTINEL NODE LOCALIZATION      Syringes containing a total of 0.5 millicuries of Tc-99m tilmanocept  (Lymphoseek) were delivered to the [facility] for injection and  sentinel lymph node localization to be  performed by the patient's  attending breast surgeon at lymph node dissection/biopsy.      FINDINGS:  Injection only, no images were obtained.      Impression: 1. Breast carcinoma undergoing peritumoral Tc-99m tilmanocept  (Lymphoseek) injection for intraoperative sentinel lymph node  localization.      I personally reviewed the images/study.. This study was interpreted  at [place], [place].      MACRO:  None          Signed by: Thompson Murillo 12/9/2024 10:25 AM  Dictation workstation:   [id number]      Assessment and Plan  Assessment & Plan    69 y.o. female with dehiscence and drainage of post op R lumpectomy incision  - Wound cultures obtained and sent in the ED  - No cellulitic changes and freely draining, wound cleansed and packed. Patient given supplies for daily wet to dry packing dressing changes with a moist 4x4. She expressed understanding  - Patient discharged on Bactrim and Keflex with follow up in office with Dr. Felix this Wednesday    Discussed with attending Dr. Isidro Walker,  - PGY3  General Surgery

## 2025-01-06 NOTE — ED PROVIDER NOTES
"    HPI   Chief Complaint   Patient presents with    Post-op Problem       HPI: Patient is a 69-year-old female, she has a past medical history of a recent right breast lumpectomy with sentinel lymph node biopsy performed by Dr. Felix on 9 December, she is awaiting the results of pathology, she is presenting to the emergency department today for concern for a wound infection.  About 4 days ago she noticed that the incision site broke open and she started to have some foul-smelling purulent drainage.  She has not had any fevers or chills, has noticed a little bit of redness at the site, little bit of pain as well.  Denies any vomiting, denies any trauma.  Is compliant with all other medications..      ROS: Complete 12 point review of systems performed, otherwise negative except as noted in the history of present illness    PMH: Reviewed, documented below in note. Pertinents in HPI  PSH: Reviewed and documented below in note. Pertinents in HPI  SH: No illicits, not homeless  Fam: Reviewed, noncontributory to patients current complaint  MEDS: Reviewed and documented below in note. Pertinents in HPI  ALLERGIES: Reviewed and documented below in note.        The patient has given verbal consent to have photos taken and inserted into their provider note as a part of their permanent medical record for purposes of documentation, treatment management, and/or medical review. All images taken were transmitted and stored on a secure Epic  Site located within a Media Folder Tab by a registered Epic-[employer] Mobile Application Device. See \"Media\" tab in Epic or photo as below.         History provided by:  Patient   used: No                          [place] Coma Scale Score: 15                  Patient History   Past Medical History:   Diagnosis Date    Hyperlipidemia     Mass of upper outer quadrant of right breast 10/15/2024    12 o'clock; 3cm from nipple (palpable)       Past Surgical History: " "  Procedure Laterality Date    ANKLE Left     BREAST LUMPECTOMY Right 12/09/2024    Right partial mastectomy with sentinel lymph node biopsy    HYSTERECTOMY      TOTAL KNEE ARTHROPLASTY Right 01/2023     Family History   Problem Relation Name Age of Onset    Hypertension Mother      Diabetes Mother      Hypertension Father      Lung cancer Sister       Social History     Tobacco Use    Smoking status: Every Day     Types: Cigarettes    Smokeless tobacco: Never   Vaping Use    Vaping status: Former   Substance Use Topics    Alcohol use: Yes     Comment: ocassionally    Drug use: Yes     Types: Marijuana     Comment: over a week ago       Physical Exam   Visit Vitals  BP (!) 145/100   Pulse 65   Temp 36.8 °C (98.2 °F) (Skin)   Resp 12   Ht 1.575 m (5' 2\")   Wt 104 kg (230 lb)   SpO2 97%   BMI 42.07 kg/m²   OB Status Hysterectomy   Smoking Status Every Day   BSA 2.13 m²      Physical Exam  Vitals and nursing note reviewed.   Constitutional:       Appearance: Normal appearance.   HENT:      Head: Normocephalic and atraumatic.   Neck:      Vascular: No carotid bruit.   Cardiovascular:      Rate and Rhythm: Normal rate and regular rhythm.      Pulses: Normal pulses.      Heart sounds: Normal heart sounds.   Pulmonary:      Effort: Pulmonary effort is normal.      Breath sounds: Normal breath sounds.   Abdominal:      General: There is no distension.      Palpations: Abdomen is soft.      Tenderness: There is no abdominal tenderness. There is no guarding or rebound.   Musculoskeletal:         General: No tenderness, deformity or signs of injury.      Cervical back: Normal range of motion. No rigidity.   Skin:     Capillary Refill: Capillary refill takes less than 2 seconds.      Comments: Patient's right breast demonstrates an open area roughly a centimeter and 1/2 to 2 cm with purulent drainage able to be expressed.  Patient has erythema surrounding it, it is warm and tender to palpation.    See media tab for photo "   Neurological:      General: No focal deficit present.      Mental Status: She is alert and oriented to person, place, and time.      Sensory: No sensory deficit.      Motor: No weakness.   Psychiatric:         Mood and Affect: Mood normal.         Behavior: Behavior normal.         No orders to display       Labs Reviewed   COMPREHENSIVE METABOLIC PANEL - Abnormal       Result Value    Glucose 113 (*)     Sodium 136      Potassium 4.0      Chloride 104      Bicarbonate 25      Anion Gap 11      Urea Nitrogen 16      Creatinine 0.79      eGFR 82      Calcium 9.2      Albumin 4.1      Alkaline Phosphatase 78      Total Protein 7.4      AST 13      Bilirubin, Total 0.5      ALT 18     LACTATE - Normal    Lactate 1.1      Narrative:     Venipuncture immediately after or during the administration of Metamizole may lead to falsely low results. Testing should be performed immediately prior to Metamizole dosing.   TISSUE/WOUND CULTURE/SMEAR   CBC WITH AUTO DIFFERENTIAL    WBC 7.1      nRBC 0.0      RBC 4.73      Hemoglobin 14.2      Hematocrit 42.8      MCV 91      MCH 30.0      MCHC 33.2      RDW 12.7      Platelets 262      Neutrophils % 63.1      Immature Granulocytes %, Automated 0.1      Lymphocytes % 26.3      Monocytes % 6.3      Eosinophils % 3.5      Basophils % 0.7      Neutrophils Absolute 4.47      Immature Granulocytes Absolute, Automated 0.01      Lymphocytes Absolute 1.87      Monocytes Absolute 0.45      Eosinophils Absolute 0.25      Basophils Absolute 0.05           ED Course & MDM   Diagnoses as of 01/06/25 1526   Infection of deep incisional surgical site after procedure, initial encounter           Medical Decision Making  All mentioned lab results, ECGs, and imaging were independently reviewed by myself  - Patient evaluated. Patient is presenting to the emergency department today for concern for postop wound infection.  Differential includes but is not limited to a superficial cellulitis and abscess  versus a deeper seeding infection, fistula tract, or abscess that goes intrathoracic leg.  Basic labs were obtained to look for evidence of sepsis, wound culture was obtained by myself as well.  Surgery was consulted.  Initially had a CT scan ordered to look for evidence of deep seeding abscess or potential tracking.  After surgery saw the patient they were able to probe the wound, appears deep superficial but does not appear to track.  They feel that a CT scan is not warranted in this case especially in the setting of her normal white count and lactate on labs which I feel is appropriate.  Patient's wound was packed by surgery, see their consult note for full details.  Oral antibiotics were initiated.  Patient had wound care supplies given to her, prescription for meds to beds was given, all questions were answered, strict return precautions were given and discussed, the patient was discharged otherwise stable condition with follow-up to general surgery.  - Monitored for any changes in stability or symptomatology. Patient remained stable.   - Counseled regarding labs, imaging, diagnosis, and plan. Patient was agreeable. All questions were answered. The patient was receptive and agreeable to the plan of care.   -The patient was instructed to return to the emergency department if any symptoms recurred, worsened, or if there were any additional concerns.    *Disclaimer: This note was dictated by speech recognition. Minor errors in transcription may be present. Please call with questions.    Silverio Porras MD             Your medication list        START taking these medications        Instructions Last Dose Given Next Dose Due   cephalexin 500 mg capsule  Commonly known as: Keflex      Take 1 capsule (500 mg) by mouth 4 times a day for 7 days.       sulfamethoxazole-trimethoprim 800-160 mg tablet  Commonly known as: Bactrim DS      Take 1 tablet by mouth 2 times a day for 7 days.              ASK your doctor about  these medications        Instructions Last Dose Given Next Dose Due   atorvastatin 20 mg tablet  Commonly known as: Lipitor           buPROPion  mg 24 hr tablet  Commonly known as: Wellbutrin XL           celecoxib 100 mg capsule  Commonly known as: CeleBREX           meloxicam 7.5 mg tablet  Commonly known as: Mobic                     Where to Get Your Medications        These medications were sent to [facility]  [address] [address]      Hours: 8AM to 6PM Mon-Fri, 8AM to 4PM Sat-Sun Phone: [phone]   cephalexin 500 mg capsule  sulfamethoxazole-trimethoprim 800-160 mg tablet         Procedure  Procedures     *This report was transcribed using voice recognition software.  Every effort was made to ensure accuracy; however, inadvertent computerized transcription errors may be present.*  Fernando Porras MD  01/06/25         Fernando Porras MD  01/06/25 1526

## 2025-01-06 NOTE — ED TRIAGE NOTES
Pt had a breast surgery done Dec 9th at [facility] and states that the incision is open. She states she had a lump removed along with some lymph nodes. Has not had fever or chills. Noticed drainage with odor. First noticed in Thursday and states it has opened more. Dr. Sewell was the

## 2025-01-08 ENCOUNTER — TELEPHONE (OUTPATIENT)
Dept: HEMATOLOGY/ONCOLOGY | Facility: CLINIC | Age: 70
End: 2025-01-08

## 2025-01-08 ENCOUNTER — PHARMACY VISIT (OUTPATIENT)
Dept: PHARMACY | Facility: CLINIC | Age: 70
End: 2025-01-08
Payer: COMMERCIAL

## 2025-01-08 ENCOUNTER — APPOINTMENT (OUTPATIENT)
Dept: SURGERY | Facility: CLINIC | Age: 70
End: 2025-01-08
Payer: COMMERCIAL

## 2025-01-08 VITALS
WEIGHT: 228 LBS | BODY MASS INDEX: 41.96 KG/M2 | HEART RATE: 90 BPM | DIASTOLIC BLOOD PRESSURE: 74 MMHG | HEIGHT: 62 IN | SYSTOLIC BLOOD PRESSURE: 108 MMHG | OXYGEN SATURATION: 97 %

## 2025-01-08 DIAGNOSIS — C50.411 MALIGNANT NEOPLASM OF UPPER-OUTER QUADRANT OF RIGHT BREAST IN FEMALE, ESTROGEN RECEPTOR POSITIVE: Primary | ICD-10-CM

## 2025-01-08 DIAGNOSIS — T14.8XXA WOUND INFECTION: ICD-10-CM

## 2025-01-08 DIAGNOSIS — Z17.0 MALIGNANT NEOPLASM OF UPPER-OUTER QUADRANT OF RIGHT BREAST IN FEMALE, ESTROGEN RECEPTOR POSITIVE: Primary | ICD-10-CM

## 2025-01-08 DIAGNOSIS — L08.9 WOUND INFECTION: ICD-10-CM

## 2025-01-08 PROCEDURE — RXMED WILLOW AMBULATORY MEDICATION CHARGE

## 2025-01-08 PROCEDURE — 1159F MED LIST DOCD IN RCRD: CPT | Performed by: SURGERY

## 2025-01-08 PROCEDURE — 3008F BODY MASS INDEX DOCD: CPT | Performed by: SURGERY

## 2025-01-08 PROCEDURE — 1160F RVW MEDS BY RX/DR IN RCRD: CPT | Performed by: SURGERY

## 2025-01-08 PROCEDURE — 99024 POSTOP FOLLOW-UP VISIT: CPT | Performed by: SURGERY

## 2025-01-08 RX ORDER — SODIUM CHLORIDE 0.9 % (FLUSH) 0.9 %
10 SYRINGE (ML) INJECTION 2 TIMES DAILY
Qty: 300 ML | Refills: 3 | Status: SHIPPED | OUTPATIENT
Start: 2025-01-08 | End: 2025-02-07

## 2025-01-08 ASSESSMENT — ENCOUNTER SYMPTOMS
HEMATURIA: 0
CONSTIPATION: 0
FEVER: 0
SHORTNESS OF BREATH: 0
ABDOMINAL PAIN: 0
BRUISES/BLEEDS EASILY: 0
WOUND: 0
DIARRHEA: 0
POLYPHAGIA: 0
EYE PAIN: 0
FREQUENCY: 0
HEADACHES: 0
FLANK PAIN: 0
COUGH: 1
CONFUSION: 0
CHILLS: 0
WEAKNESS: 0
DYSURIA: 0
EYE REDNESS: 0
ARTHRALGIAS: 1
SPEECH DIFFICULTY: 0
AGITATION: 0
NAUSEA: 0
FATIGUE: 0
VOMITING: 0
MYALGIAS: 0

## 2025-01-08 NOTE — LETTER
January 8, 2025     Patient: Vira Gordon   YOB: 1955   Date of Visit: 1/8/2025       To Whom It May Concern:    Vira Gordon had undergone surgery on 12/9/2024.  Due to postoperative restrictions, she was kept out of work.  She has been seen in the office today, 1/8/2025, and has an open wound from her surgical site which will require ongoing wound care.  Because of the open wound, she cannot return back to work at this time.  She most likely will need at least 4 more weeks out of work.  She will be monitored in the office every 2 weeks for wound healing.  Official released to return back to work will not be given until the wound is healed completely.    If you have any questions, please contact our office at [phone].        Sincerely,      Richa Felix MD  [address]  [address] [phone]

## 2025-01-08 NOTE — PROGRESS NOTES
GENERAL SURGERY OFFICE NOTE    Patient: Vira Gordon    Age: 69 y.o.   Gender: female    MRN: [id number]    PCP: Aurora Delgadillo MD        SUBJECTIVE     Chief Complaint  Follow-up (Patient is here for a 2 week right breast wound check. Patient states that on 1/2/25 she noticed she had some leaking from the incision. Patient states that she also noticed a foul odor. Patient states that her incision is infected and she went to the ER on 1/6/25. Patient states that they put her on 2 antibiotics. )       JAVAD Constantino returns to the office for a 4-week postop check after undergoing a right breast lumpectomy (palpable mass) and sentinel lymph node biopsy surgery.  At the time of her surgery, 3 sentinel lymph nodes were removed.  She went to the emergency room 2 days ago due to sudden onset of drainage from the lateral aspect of her breast incision.  This was felt to be a draining hematoma/seroma.  There was very minimal erythema, but was placed on oral antibiotics.  She is not running any fevers.  She continues to have a moderate amount of drainage from the open wound.  She is packing the corner of a 4 x 4 into the open wound.  She is still eating and drinking well without any nausea or vomiting.    Original risk factors for breast cancer: 69-year-old white female; menarche at age 14; first live birth at age 20; no previous breast biopsy; no family history of breast cancer.  No family history of ovarian/pancreatic/prostate cancer.  She has a sister who had tobacco related lung cancer.  She went through menopause around her mid 50s, never took any hormone replacement therapy.  This gives her a 5-year Antonia score of 1.2% and a lifetime risk of 4.2% which puts her in a less than average risk category.    ROS  Review of Systems   Constitutional:  Negative for chills, fatigue and fever.   HENT:  Negative for congestion, ear pain and hearing loss.    Eyes:  Negative for pain and redness.   Respiratory:  Positive for  cough. Negative for shortness of breath.    Cardiovascular:  Negative for chest pain and leg swelling.   Gastrointestinal:  Negative for abdominal pain, constipation, diarrhea, nausea and vomiting.   Endocrine: Negative for polyphagia.   Genitourinary:  Negative for dysuria, flank pain, frequency and hematuria.   Musculoskeletal:  Positive for arthralgias. Negative for myalgias.   Skin:  Negative for rash and wound.   Allergic/Immunologic: Negative for immunocompromised state.   Neurological:  Negative for speech difficulty, weakness and headaches.   Hematological:  Does not bruise/bleed easily.   Psychiatric/Behavioral:  Negative for agitation and confusion.           HISTORY     Past Medical History:   Diagnosis Date    Hyperlipidemia     Mass of upper outer quadrant of right breast 10/15/2024    12 o'clock; 3cm from nipple (palpable)          Past Surgical History:   Procedure Laterality Date    ANKLE Left     BREAST LUMPECTOMY Right 12/09/2024    Right partial mastectomy with sentinel lymph node biopsy    HYSTERECTOMY      TOTAL KNEE ARTHROPLASTY Right 01/2023        Family History   Problem Relation Name Age of Onset    Hypertension Mother      Diabetes Mother      Hypertension Father      Lung cancer Sister          Allergies   Allergen Reactions    Darvocet A500 [Propoxyphene N-Acetaminophen] Other        Social History     Tobacco Use   Smoking Status Every Day    Types: Cigarettes   Smokeless Tobacco Never        Social History     Substance and Sexual Activity   Alcohol Use Yes    Comment: ocassionally        HOME MEDICATIONS  Current Outpatient Medications   Medication Instructions    atorvastatin (Lipitor) 20 mg tablet 1 tablet, Daily    buPROPion XL (Wellbutrin XL) 150 mg 24 hr tablet 1 tablet, Every morning    celecoxib (CELEBREX) 100 mg, 2 times daily    cephalexin (KEFLEX) 500 mg, oral, 4 times daily    meloxicam (MOBIC) 7.5 mg    sodium chloride 0.9% (Normal Saline Flush) flush 10 mL,  "intra-catheter, 2 times daily    sulfamethoxazole-trimethoprim (Bactrim DS) 800-160 mg tablet 1 tablet, oral, 2 times daily          OBJECTIVE   Last Recorded Vitals.  Blood pressure 108/74, pulse 90, height 1.575 m (5' 2\"), weight 103 kg (228 lb), SpO2 97%.     PHYSICAL EXAM  Physical Exam   Phone visit/previous exam:  General: Well-developed, well-nourished and in no acute distress.  Head: Normocephalic. Atraumatic.  Neck/thyroid: Neck is supple.   Eyes: Pupils equal round and reactive to light. Conjunctiva normal.  ENMT: No masses or deformity of external nose. External ears without masses.  Respiratory/Chest:  Normal respiratory effort.  Breast: Moderate to large, symmetrical breasts.  No palpable abnormality of the left breast.  On the right breast, just above the areola, the surgical incision is healing well except for the lateral aspect of the wound which is open 2 cm x 1 cm.  There is approximately a 2 cm depth through the wound.  No surrounding erythema.  There is some redness consistent with induration from the methylene blue injection.  There still is a moderate amount of methylene blue staining on the lateral aspect of the breast.    Lymphatics: No palpable lymphadenopathy of the cervical, supraclavicular or axillary regions.  Right axillary incision healing well with just a small amount of skin irritation.  Cardiovascular: Regular rate and rhythm.   Abdomen: Soft, nontender, nondistended.   Musculoskeletal: Joints and limbs are grossly normal. Normal gait. Normal range of motion of major joints.  Neuro: Oriented to person, place and time. No obvious neurological deficit. Motor strength grossly normal.  Psych: Normal mood and affect.    RESULTS   Labs  Surgical Pathology Exam: [id number]  Order: [id number]   Collected 10/16/2024 12:12       Status: Final result       Visible to patient: No (inaccessible in [facility])    0 Result Notes       Component  Resulting Agency   FINAL DIAGNOSIS   A. Right breast " mass, 2:00 3 cm from nipple, ultrasound guided core biopsy:   -- Invasive ductal carcinoma with associated microcalcifications, grade 2, see note.     Note:  In this limited sample, the invasive carcinoma measures up to 7 mm in greatest dimension.  ER, AK and HER2 will be reported in a synoptic report.    Electronically signed by Rachid Lentz MD on 10/22/2024 at [address] [place] [address]   By the signature on this report, the individual or group listed as making the Final Interpretation/Diagnosis certifies that they have reviewed this case.    Case Summary Report   Breast Biomarker Reporting Template   Protocol posted: 12/13/2023BREAST BIOMARKER REPORTING TEMPLATE - A  Test(s) Performed     Estrogen Receptor (ER) Status  Positive (greater than 10% of cells demonstrate nuclear positivity)   Percentage of Cells with Nuclear Positivity  >95 %   Average Intensity of Staining  Strong   Test Type  Food and Drug Administration (FDA) cleared (test / vendor): Roche CONFIRM anti-(ER) (SP1) Rabbit Monoclonal Primary Antibody   Primary Antibody  SP1   Scoring System  No separate scoring system used   Test(s) Performed     Progesterone Receptor (PgR) Status  Positive   Percentage of Cells with Nuclear Positivity  90 %   Average Intensity of Staining  Strong   Test Type  Food and Drug Administration (FDA) cleared (test / vendor): Roche CONFIRM anti-(AK) (1E2) Rabbit Monoclonal Primary Anitbody   Primary Antibody  1E2   Scoring System  No separate scoring system used   Test(s) Performed     HER2 by Immunohistochemistry  Negative (Score 0)   Test Type  Food and Drug Administration (FDA) cleared (test / vendor): Roche Pathway anti-HER-2/sabrina (4B5) Rabbit Monoclonal Primary Antibody             Tissue/Wound Culture/Smear  Order: [id number]   Collected 1/6/2025 13:15       Status: Preliminary result       Visible to patient: No (not released)    Specimen Information: Other (specify in comments); Tissue/Biopsy   0 Result Notes  Tissue/Wound  Culture/Smear (4+) Abundant Methicillin Susceptible Staphylococcus aureus (MSSA) Abnormal    (4+) Abundant Mixed Aerobic and Anaerobic Bacteria            Gram Stain  Abnormal   (4+) Abundant Polymorphonuclear leukocytes   (4+) Abundant Mixed Gram positive and Gram negative bacteria           Resulting Agency: [facility]     Susceptibility     Methicillin Susceptible Staphylococcus aureus (MSSA)     MICROSCAN    Clindamycin Susceptible    Erythromycin Resistant    Oxacillin Susceptible    Tetracycline Susceptible    Trimethoprim/Sulfamethoxazole Susceptible    Vancomycin Susceptible              Linear View        Specimen Collected: 01/06/25 13:15 Last Resulted: 01/08/25 12:23       Radiology Resutls  mammo bilateral diagnostic tomosynthesis  BI US breast limited right  Status: Final result     PACS Images     Show images for BI mammo bilateral diagnostic tomosynthesis  Signed by    Signed Time Phone Pager   Edwin De MD 10/08/2024 12:52 [phone]     Exam Information    Status Exam Begun Exam Ended   Final 10/08/2024 11:18 10/08/2024 11:45     Study Result    Narrative & Impression   Interpreted By:  Ewdin De,   STUDY:  BI MAMMO BILATERAL DIAGNOSTIC TOMOSYNTHESIS; BI US BREAST LIMITED  RIGHT;  10/8/2024 11:45 am; 10/8/2024 12:10 pm      ACCESSION NUMBER(S):  [id number]; [id number]      ORDERING CLINICIAN:  MURTAZA GERBER      INDICATION:  Palpable abnormality right breast      ,N63.10 Unspecified lump in the right breast, unspecified  quadrant,N63.15 Unspecified lump in the right breast, overlapping  quadrants      COMPARISON:  No prior examination available for comparison. If a prior examination  becomes available, this examination will be compared to the prior  study. Addendum report will be issued.      FINDINGS:  MAMMOGRAPHY: 2D and tomosynthesis images were reviewed at 1 mm slice  thickness.      Density:  The breasts are almost entirely fatty.      Right breast: 3.3 cm spiculated  mass identified of the 12 o'clock  middle depth worrisome for malignancy. Prominent albeit small lymph  node of the posterior depth right breast MLO projection.      Left breast: Normal fibroglandular tissue. No findings worrisome for  malignancy.      ULTRASOUND: Targeted ultrasound was performed of the right breast by  a registered sonographer with elastography.      Right breast 12 o'clock position 3 cm from nipple demonstrates a  lobulated mass with posterior acoustic shadowing measuring up to 3.1  cm in greatest dimension. Elastography stiff.      Right axilla demonstrates a normal lymph node. The 5 mm lymph node of  the posterior depth right breast only seen on MLO projection  mammography is not sonographically apparent on this examination.      IMPRESSION:  Large right breast mass favoring malignancy.      Left breast is normal.      BI-RADS CATEGORY:  BI-RADS CATEGORY:  5 Highly Suggestive of Malignancy.  Recommendation:  Surgical Consultation and Biopsy.  Recommended Date:  Immediate.  Laterality:  Right.      Ultrasound-guided biopsy right breast recommended. Surgical  consultation with history and physical required prior to biopsy.     MR breast bilateral w contrast full protocol  Status: Final result     PACS Images     Show images for MR breast bilateral w contrast full protocol  Signed by    Signed Time Phone Pager   Em Rosales MD 11/04/2024 09:03 [phone]     Exam Information    Status Exam Begun Exam Ended   Final 10/31/2024 14:24 10/31/2024 15:02     Study Result    Narrative & Impression   Interpreted By:  Em Rosales and Marshall Colin   STUDY:  BI MR BREAST BILATERAL WITH CONTRAST FULL PROTOCOL;  10/31/2024 3:02  pm      ACCESSION NUMBER(S):  [id number]      ORDERING CLINICIAN:  JILLIAN SHEPPARD      INDICATION:  Recently diagnosed invasive ductal carcinoma of the right breast.      ,C50.411 Malignant neoplasm of upper-outer quadrant of right female  breast,Z17.0 Estrogen  receptor positive status (ER+)      COMPARISON:  Mammogram and ultrasound dated 10/08/2024 and 10/16/2024.      TECHNIQUE:  Using a dedicated breast coil, STIR axial and T1-weighted fat  saturation axial images of the breasts were obtained, the latter both  before and after intravenous administration of Gadolinium DTPA. On an  independent workstation, 3-D images were formulated using [employer]  including time enhancement curves, subtraction images and MIP images.      Intravenous contrast: 20 ML of Dotarem      FINDINGS:  Density: Almost entirely fat.      There is symmetric mild bilateral background enhancement.      RIGHT BREAST:  Within the superior central quadrant at anterior to  middle depth, there is an irregular spiculated rim enhancing mass  measuring 3.1 x 2.6 x 2.8 cm (series 502, image 105) with an internal  focus of susceptibility artifact corresponding to the biopsy tissue  marker compatible with patient's biopsy-proven right breast cancer.  There is associated skin retraction without evidence of extent to the  skin surface or chest wall. There is no evidence of nipple  involvement. No additional abnormal enhancing masses or non-mass  enhancement.      No axillary or internal mammary lymphadenopathy is appreciated.      LEFT BREAST:  No suspicious mass or nonmass enhancement is identified.      No axillary or internal mammary lymphadenopathy is appreciated.      NON-BREAST FINDINGS:  None.      IMPRESSION:  1. Biopsy-proven right breast cancer as described above. Surgical  consultation is recommended.  2. No MRI evidence of malignancy in the left breast.  3. No axillary or internal mammary lymphadenopathy.      BI-RADS CATEGORY:  BI-RADS CATEGORY:  6 Known Biopsy-Proven Malignancy.  Recommendation:  Immediate Follow-up.  Recommended Date:  Immediate.  Laterality:  Right.      For any future breast imaging appointments, please call [phone]).      I personally reviewed the images/study and I  agree with the breast  imaging fellow, Brendan Rosales D.O., findings as stated. This study  was interpreted at [facility],  [place], [place].      MACRO:  None      Signed by: Em Rosales 11/4/2024 9:03 AM  Dictation workstation:   [id number]     PATHOLOGY  Surgical Pathology Exam: [id number]  Order: [id number]   Collected 12/9/2024 11:29       Status: Final result       Visible to patient: No (inaccessible in [facility])       Dx: Malignant neoplasm of upper-outer sheila...    0 Result Notes       Component  Resulting Agency   FINAL DIAGNOSIS   A.  Right sentinel lymph node #1, biopsy:   -- One lymph node, negative for metastatic carcinoma (0/1)     B.  Right sentinel lymph node #2, biopsy:   -- One lymph node, negative for metastatic carcinoma (0/1)     C.  Right sentinel lymph node #3, biopsy:   -- One lymph node, negative for metastatic carcinoma (0/1)     D.  Right breast, lumpectomy:    -- Invasive ductal carcinoma, grade 2 (see synoptic report)   -- Ductal carcinoma in situ, intermediate nuclear grade  -- Biopsy site changes       Jake Zamudio MD, PhD        Electronically signed by Jake Zamudio MD PhD on 12/24/2024 at 1448      [facility]   By the signature on this report, the individual or group listed as making the Final Interpretation/Diagnosis certifies that they have reviewed this case.    Case Summary Report   INVASIVE CARCINOMA OF THE BREAST: Resection   8th Edition - Protocol posted: 6/19/2024INVASIVE CARCINOMA OF THE BREAST: RESECTION - All Specimens  SPECIMEN   Procedure  Excision (less than total mastectomy)   Specimen Laterality  Right   TUMOR   Histologic Type  Invasive carcinoma of no special type (ductal)   Histologic Grade (Silvia Histologic Score)     Glandular (Acinar) / Tubular Differentiation  Score 3   Nuclear Pleomorphism  Score 2   Mitotic Rate  Score 1   Overall Grade  Grade 2 (scores of 6 or 7)   Tumor Size  Greatest dimension of largest invasive focus  (Millimeters): 38 mm mm   Tumor Focality  Single focus of invasive carcinoma   Ductal Carcinoma In Situ (DCIS)  Present     Negative for extensive intraductal component (EIC)   Size (Extent) of DCIS  Estimated size (extent) of DCIS is at least (Millimeters): 38 mm   Architectural Patterns  Cribriform     Solid   Nuclear Grade  Grade II (intermediate)   Necrosis  Present, focal (small foci or single cell necrosis)   Lymphatic and / or Vascular Invasion  Not identified   Dermal Lymphatic and / or Vascular Invasion  Not identified   Microcalcifications  Present in invasive carcinoma     Present in non-neoplastic tissue   Treatment Effect in the Breast  No known presurgical therapy   MARGINS   Margin Status for Invasive Carcinoma  All margins negative for invasive carcinoma   Distance from Invasive Carcinoma to Closest Margin  Greater than: 4 mm   Margin Status for DCIS  All margins negative for DCIS   Distance from DCIS to Closest Margin  Greater than: 4 mm   REGIONAL LYMPH NODES   Regional Lymph Node Status  All regional lymph nodes negative for tumor   Total Number of Lymph Nodes Examined (sentinel and non-sentinel)  3   Number of [place] Nodes Examined  3   pTNM CLASSIFICATION (AJCC 8th Edition)   Reporting of pT, pN, and (when applicable) pM categories is based on information available to the pathologist at the time the report is issued. As per the AJCC (Chapter 1, 8th Ed.) it is the managing physician's responsibility to establish the final pathologic stage based upon all pertinent information, including but potentially not limited to this pathology report.   pT Category  pT2   pN Category  pN0   N Suffix  (sn)             ASSESSMENT / PLAN   Diagnoses and all orders for this visit:  Malignant neoplasm of upper-outer quadrant of right breast in female, estrogen receptor positive  -     Referral To Hematology and Oncology; Future  Wound infection  -     sodium chloride 0.9% (Normal Saline Flush) flush; 10 mL by  intra-catheter route 2 times a day.      Plan  Oct 2024: RIGHT: palp 3.3cm mass at 12 o'clock, 3cm from nipple; IDC; gr2; >95/90/neg; s/p lumpectomy with SLNB; pT2 (3.8cm) N0 (3 neg SLNs) M0 with negative margins    1.  Pathology of the core needle biopsy of the palpable right breast mass confirmed invasive ductal carcinoma.  The size of the mass is approximately 3.3 cm by radiographic evaluation which would make this a T2 lesion.  There is some concern for possible overlying skin involvement as there is some dimpling in the skin just above the areolar region.  The MRI confirmed a 3.1 cm area of concern.  There does seem to be some retraction of the overlying skin, but no obvious involvement of cancer within the skin itself.  Radiographically, lymph nodes are negative.  Surgical treatment was undertaken with a right lumpectomy (palpable mass) and sentinel lymph node biopsy.  Surgical pathology shows a T2 lesion with clear margins.  2.  She still has a significant amount of methylene blue staining of the lateral aspect of the breast.  The redness that she is seen on the breast is due to the methylene blue irritation.  The lateral aspect of the wound opened up which may be secondary to drainage from the surgical seroma versus underlying infection.  She is already been given to oral antibiotics which she will complete.  Will have her pack a corner of the 4 x 4 into the open wound.  This should be a wet-to-dry dressing change with normal saline.  Will see her back in the office in 2 weeks to determine when she can start radiation treatment.  3.  Her cancer is ER/TN positive.  Refer to medical oncology for endocrine therapy.    4.  She has been seen by radiation oncology.  Holding on planning CT until her wound is better healed.  Message sent to Dr. Verdin that she is having healing issues and will notify him after her next appointment in 2 weeks.  5.  She does not have any family history of breast cancer; therefore,  does not meet criteria for referral to genetics.  6.  Since she is still under work restrictions since she has an open wound and works at [employer] with .  Will keep her out of work at least 4 more weeks until the wound has completely healed.  Note provided for the job.    Richa Felix MD, FACS  [facility] General Surgery  [address] [place] [address];   [address]  [place]  [address] [phone]

## 2025-01-08 NOTE — PATIENT INSTRUCTIONS
1.  Finish the antibiotics as prescribed from the emergency room.  Take until gone even if you are feeling better.  2.  Continue once a day wet-to-dry dressing changes to the right breast wound.  You should be changing the packing in the wound once a day.  You may change the top dry dressing more often if needed.  Will use the normal saline from the syringes to moisten the gauze before packing.  3.  Referral has been made to medical oncology to discuss endocrine therapy and possible chemotherapy.  4.  Message will be sent to your radiation oncologist as your radiation treatment will be delayed until after your wound has completely healed.  5.  You will need to stay out of work until your open wound has healed.  Please see the note provided.  6.  Return to Dr. Felix's office in 2 weeks for wound check.

## 2025-01-10 LAB
B-LACTAMASE ORGANISM ISLT: POSITIVE
BACTERIA SPEC CULT: ABNORMAL
BACTERIA SPEC CULT: ABNORMAL
GRAM STN SPEC: ABNORMAL
GRAM STN SPEC: ABNORMAL

## 2025-01-13 ENCOUNTER — TELEPHONE (OUTPATIENT)
Dept: PHARMACY | Facility: HOSPITAL | Age: 70
End: 2025-01-13
Payer: COMMERCIAL

## 2025-01-13 NOTE — PROGRESS NOTES
EDPD Note: Antibiotics Reviewed and Warranted    Contacted Mr./Mrs./Ms. Vira Gordon regarding a positive wound culture/result that was taken during their recent emergency room visit. I completed education with patient . The patient is being treated appropriately with Bactrim DS BID and Keflex 500 mg QID.     Seen in Ed for broken incision site that had foul smelling purulent drainage. Pt was given Kelfex and Bactrim for wound infection. Bactrim was only 1 tablet BID. Today patient reported her wound has been healing well with no signs of infection. Advised patient if infection does reappear to notify EDPD. If infections does reapear, would recommend Bactrim DS 2 tabs BID x 5 days.     Susceptibility data from last 90 days.  Collected Specimen Info Organism Clindamycin Erythromycin Oxacillin Tetracycline Trimethoprim/Sulfamethoxazole Vancomycin   01/06/25 Tissue/Biopsy from Other (specify in comments) Methicillin Susceptible Staphylococcus aureus (MSSA)  S  R  S  S  S  S     Mixed Aerobic and Anaerobic Bacteria               No further follow up needed from EDPD Team.     Mirella Acosta [facility]

## 2025-01-22 ENCOUNTER — TELEPHONE (OUTPATIENT)
Dept: RADIATION ONCOLOGY | Facility: HOSPITAL | Age: 70
End: 2025-01-22

## 2025-01-22 ENCOUNTER — APPOINTMENT (OUTPATIENT)
Dept: SURGERY | Facility: CLINIC | Age: 70
End: 2025-01-22
Payer: COMMERCIAL

## 2025-01-22 VITALS
WEIGHT: 225.8 LBS | OXYGEN SATURATION: 98 % | BODY MASS INDEX: 41.55 KG/M2 | DIASTOLIC BLOOD PRESSURE: 87 MMHG | HEIGHT: 62 IN | HEART RATE: 107 BPM | SYSTOLIC BLOOD PRESSURE: 128 MMHG

## 2025-01-22 DIAGNOSIS — C50.411 MALIGNANT NEOPLASM OF UPPER-OUTER QUADRANT OF RIGHT BREAST IN FEMALE, ESTROGEN RECEPTOR POSITIVE: Primary | ICD-10-CM

## 2025-01-22 DIAGNOSIS — Z17.0 MALIGNANT NEOPLASM OF UPPER-OUTER QUADRANT OF RIGHT BREAST IN FEMALE, ESTROGEN RECEPTOR POSITIVE: Primary | ICD-10-CM

## 2025-01-22 DIAGNOSIS — T14.8XXA WOUND INFECTION: ICD-10-CM

## 2025-01-22 DIAGNOSIS — L08.9 WOUND INFECTION: ICD-10-CM

## 2025-01-22 PROCEDURE — 3008F BODY MASS INDEX DOCD: CPT | Performed by: SURGERY

## 2025-01-22 PROCEDURE — 99024 POSTOP FOLLOW-UP VISIT: CPT | Performed by: SURGERY

## 2025-01-22 PROCEDURE — 1160F RVW MEDS BY RX/DR IN RCRD: CPT | Performed by: SURGERY

## 2025-01-22 PROCEDURE — 1159F MED LIST DOCD IN RCRD: CPT | Performed by: SURGERY

## 2025-01-22 ASSESSMENT — ENCOUNTER SYMPTOMS
CONSTIPATION: 0
COUGH: 1
CONFUSION: 0
WEAKNESS: 0
DYSURIA: 0
VOMITING: 0
DIARRHEA: 0
EYE REDNESS: 0
CHILLS: 0
NAUSEA: 0
WOUND: 0
SPEECH DIFFICULTY: 0
EYE PAIN: 0
ABDOMINAL PAIN: 0
POLYPHAGIA: 0
ARTHRALGIAS: 1
FATIGUE: 0
HEADACHES: 0
SHORTNESS OF BREATH: 0
FEVER: 0
MYALGIAS: 0
BRUISES/BLEEDS EASILY: 0
AGITATION: 0
HEMATURIA: 0
FREQUENCY: 0
FLANK PAIN: 0

## 2025-01-22 NOTE — PROGRESS NOTES
GENERAL SURGERY OFFICE NOTE    Patient: Vira Gordon    Age: 69 y.o.   Gender: female    MRN: [id number]    PCP: Aurora Delgadillo MD        SUBJECTIVE     Chief Complaint  Follow-up (Patient is here for a lumpectomy wound check. Patient denies any bleeding, odor, redness or swelling from the area.)       JAVAD Constantino returns to the office for a 6-week postop check after undergoing a right breast lumpectomy (palpable mass) and sentinel lymph node biopsy surgery.  At the time of her surgery, 3 sentinel lymph nodes were removed.  She went to the emergency room 2 days ago due to sudden onset of drainage from the lateral aspect of her breast incision.  This was felt to be a draining hematoma/seroma.  There was very minimal erythema, but was placed on oral antibiotics.  She is not running any fevers.  She has very minimal drainage from the open wound.  She is only able to pack half of a 2 x 2 gauze into the wound.  She denies any significant pain.  She is still eating and drinking well without any nausea or vomiting.  No fevers.    Original risk factors for breast cancer: 69-year-old white female; menarche at age 14; first live birth at age 20; no previous breast biopsy; no family history of breast cancer.  No family history of ovarian/pancreatic/prostate cancer.  She has a sister who had tobacco related lung cancer.  She went through menopause around her mid 50s, never took any hormone replacement therapy.  This gives her a 5-year Antonia score of 1.2% and a lifetime risk of 4.2% which puts her in a less than average risk category.    ROS  Review of Systems   Constitutional:  Negative for chills, fatigue and fever.   HENT:  Negative for congestion, ear pain and hearing loss.    Eyes:  Negative for pain and redness.   Respiratory:  Positive for cough. Negative for shortness of breath.    Cardiovascular:  Negative for chest pain and leg swelling.   Gastrointestinal:  Negative for abdominal pain, constipation, diarrhea,  "nausea and vomiting.   Endocrine: Negative for polyphagia.   Genitourinary:  Negative for dysuria, flank pain, frequency and hematuria.   Musculoskeletal:  Positive for arthralgias. Negative for myalgias.   Skin:  Negative for rash and wound.   Allergic/Immunologic: Negative for immunocompromised state.   Neurological:  Negative for speech difficulty, weakness and headaches.   Hematological:  Does not bruise/bleed easily.   Psychiatric/Behavioral:  Negative for agitation and confusion.           HISTORY     Past Medical History:   Diagnosis Date    Hyperlipidemia     Mass of upper outer quadrant of right breast 10/15/2024    12 o'clock; 3cm from nipple (palpable)          Past Surgical History:   Procedure Laterality Date    ANKLE Left     BREAST LUMPECTOMY Right 12/09/2024    Right partial mastectomy with sentinel lymph node biopsy    HYSTERECTOMY      TOTAL KNEE ARTHROPLASTY Right 01/2023        Family History   Problem Relation Name Age of Onset    Hypertension Mother      Diabetes Mother      Hypertension Father      Lung cancer Sister          Allergies   Allergen Reactions    Darvocet A500 [Propoxyphene N-Acetaminophen] Other        Social History     Tobacco Use   Smoking Status Every Day    Types: Cigarettes   Smokeless Tobacco Never        Social History     Substance and Sexual Activity   Alcohol Use Yes    Comment: ocassionally        HOME MEDICATIONS  Current Outpatient Medications   Medication Instructions    atorvastatin (Lipitor) 20 mg tablet 1 tablet, Daily    buPROPion XL (Wellbutrin XL) 150 mg 24 hr tablet 1 tablet, Every morning    celecoxib (CELEBREX) 100 mg, 2 times daily    meloxicam (MOBIC) 7.5 mg    sodium chloride 0.9% (Normal Saline Flush) flush 10 mL, intra-catheter, 2 times daily          OBJECTIVE   Last Recorded Vitals.  Blood pressure 128/87, pulse 107, height 1.575 m (5' 2\"), weight 102 kg (225 lb 12.8 oz), SpO2 98%.     PHYSICAL EXAM  Physical Exam   Phone visit/previous " exam:  General: Well-developed, well-nourished and in no acute distress.  Head: Normocephalic. Atraumatic.  Neck/thyroid: Neck is supple.   Eyes: Pupils equal round and reactive to light. Conjunctiva normal.  ENMT: No masses or deformity of external nose. External ears without masses.  Respiratory/Chest:  Normal respiratory effort.  Breast: Moderate to large, symmetrical breasts.  No palpable abnormality of the left breast.  On the right breast, just above the areola, the surgical incision is healing well except for the lateral aspect of the wound which is open 2 cm x 1 cm.  There is approximately a 1.5 cm depth through the wound with good granulation tissue on all edges.  No surrounding erythema.  The previous erythema has completely resolved.  There still is a moderate amount of methylene blue staining on the lateral aspect of the breast.    Lymphatics: No palpable lymphadenopathy of the cervical, supraclavicular or axillary regions.  Right axillary incision healing well with just a small amount of skin irritation.  Cardiovascular: Regular rate and rhythm.   Abdomen: Soft, nontender, nondistended.   Musculoskeletal: Joints and limbs are grossly normal. Normal gait. Normal range of motion of major joints.  Neuro: Oriented to person, place and time. No obvious neurological deficit. Motor strength grossly normal.  Psych: Normal mood and affect.    RESULTS   Labs  Surgical Pathology Exam: [id number]  Order: [id number]   Collected 10/16/2024 12:12       Status: Final result       Visible to patient: No (inaccessible in [facility])    0 Result Notes       Component  Resulting Agency   FINAL DIAGNOSIS   A. 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     Note:  In this limited sample, the invasive carcinoma measures up to 7 mm in greatest dimension.  ER, IN and HER2 will be reported in a synoptic report.     Electronically signed by Rachid Lentz MD on 10/22/2024 at 1026      [facility]   By the signature on this report, the individual or group listed as making the Final Interpretation/Diagnosis certifies that they have reviewed this case.    Case Summary Report   Breast Biomarker Reporting Template   Protocol posted: 12/13/2023BREAST BIOMARKER REPORTING TEMPLATE - A  Test(s) Performed     Estrogen Receptor (ER) Status  Positive (greater than 10% of cells demonstrate nuclear positivity)   Percentage of Cells with Nuclear Positivity  >95 %   Average Intensity of Staining  Strong   Test Type  Food and Drug Administration (FDA) cleared (test / vendor): Roche CONFIRM anti-(ER) (SP1) Rabbit Monoclonal Primary Antibody   Primary Antibody  SP1   Scoring System  No separate scoring system used   Test(s) Performed     Progesterone Receptor (PgR) Status  Positive   Percentage of Cells with Nuclear Positivity  90 %   Average Intensity of Staining  Strong   Test Type  Food and Drug Administration (FDA) cleared (test / vendor): Roche CONFIRM anti-(NJ) (1E2) Rabbit Monoclonal Primary Anitbody   Primary Antibody  1E2   Scoring System  No separate scoring system used   Test(s) Performed     HER2 by Immunohistochemistry  Negative (Score 0)   Test Type  Food and Drug Administration (FDA) cleared (test / vendor): Roche Pathway anti-HER-2/sabrina (4B5) Rabbit Monoclonal Primary Antibody             Tissue/Wound Culture/Smear  Order: [id number]   Collected 1/6/2025 13:15       Status: Preliminary result       Visible to patient: No (not released)    Specimen Information: Other (specify in comments); Tissue/Biopsy   0 Result Notes  Tissue/Wound Culture/Smear (4+) Abundant Methicillin Susceptible Staphylococcus aureus (MSSA) Abnormal    (4+) Abundant Mixed Aerobic and Anaerobic Bacteria            Gram Stain  Abnormal   (4+) Abundant Polymorphonuclear leukocytes   (4+) Abundant Mixed Gram positive and Gram negative bacteria           Resulting  Agency: [facility]     Susceptibility     Methicillin Susceptible Staphylococcus aureus (MSSA)     MICROSCAN    Clindamycin Susceptible    Erythromycin Resistant    Oxacillin Susceptible    Tetracycline Susceptible    Trimethoprim/Sulfamethoxazole Susceptible    Vancomycin Susceptible              Linear View        Specimen Collected: 01/06/25 13:15 Last Resulted: 01/08/25 12:23       Radiology Resutls  mammo bilateral diagnostic tomosynthesis  BI US breast limited right  Status: Final result     PACS Images     Show images for BI mammo bilateral diagnostic tomosynthesis  Signed by    Signed Time Phone Pager   Edwin De MD 10/08/2024 12:52 [phone]     Exam Information    Status Exam Begun Exam Ended   Final 10/08/2024 11:18 10/08/2024 11:45     Study Result    Narrative & Impression   Interpreted By:  Edwin De,   STUDY:  BI MAMMO BILATERAL DIAGNOSTIC TOMOSYNTHESIS; BI US BREAST LIMITED  RIGHT;  10/8/2024 11:45 am; 10/8/2024 12:10 pm      ACCESSION NUMBER(S):  [id number]; [id number]      ORDERING CLINICIAN:  MURTAZA GERBER      INDICATION:  Palpable abnormality right breast      ,N63.10 Unspecified lump in the right breast, unspecified  quadrant,N63.15 Unspecified lump in the right breast, overlapping  quadrants      COMPARISON:  No prior examination available for comparison. If a prior examination  becomes available, this examination will be compared to the prior  study. Addendum report will be issued.      FINDINGS:  MAMMOGRAPHY: 2D and tomosynthesis images were reviewed at 1 mm slice  thickness.      Density:  The breasts are almost entirely fatty.      Right breast: 3.3 cm spiculated mass identified of the 12 o'clock  middle depth worrisome for malignancy. Prominent albeit small lymph  node of the posterior depth right breast MLO projection.      Left breast: Normal fibroglandular tissue. No findings worrisome for  malignancy.      ULTRASOUND: Targeted ultrasound was performed of  the right breast by  a registered sonographer with elastography.      Right breast 12 o'clock position 3 cm from nipple demonstrates a  lobulated mass with posterior acoustic shadowing measuring up to 3.1  cm in greatest dimension. Elastography stiff.      Right axilla demonstrates a normal lymph node. The 5 mm lymph node of  the posterior depth right breast only seen on MLO projection  mammography is not sonographically apparent on this examination.      IMPRESSION:  Large right breast mass favoring malignancy.      Left breast is normal.      BI-RADS CATEGORY:  BI-RADS CATEGORY:  5 Highly Suggestive of Malignancy.  Recommendation:  Surgical Consultation and Biopsy.  Recommended Date:  Immediate.  Laterality:  Right.      Ultrasound-guided biopsy right breast recommended. Surgical  consultation with history and physical required prior to biopsy.     MR breast bilateral w contrast full protocol  Status: Final result     PACS Images     Show images for MR breast bilateral w contrast full protocol  Signed by    Signed Time Phone Pager   Em Rosales MD 11/04/2024 09:03 [phone]     Exam Information    Status Exam Begun Exam Ended   Final 10/31/2024 14:24 10/31/2024 15:02     Study Result    Narrative & Impression   Interpreted By:  Em Rosales and Marshall Colin   STUDY:  BI MR BREAST BILATERAL WITH CONTRAST FULL PROTOCOL;  10/31/2024 3:02  pm      ACCESSION NUMBER(S):  [id number]      ORDERING CLINICIAN:  JILLIAN SHEPPARD      INDICATION:  Recently diagnosed invasive ductal carcinoma of the right breast.      ,C50.411 Malignant neoplasm of upper-outer quadrant of right female  breast,Z17.0 Estrogen receptor positive status (ER+)      COMPARISON:  Mammogram and ultrasound dated 10/08/2024 and 10/16/2024.      TECHNIQUE:  Using a dedicated breast coil, STIR axial and T1-weighted fat  saturation axial images of the breasts were obtained, the latter both  before and after intravenous administration  of Gadolinium DTPA. On an  independent workstation, 3-D images were formulated using AquarisPLUS IntD  including time enhancement curves, subtraction images and MIP images.      Intravenous contrast: 20 ML of Dotarem      FINDINGS:  Density: Almost entirely fat.      There is symmetric mild bilateral background enhancement.      RIGHT BREAST:  Within the superior central quadrant at anterior to  middle depth, there is an irregular spiculated rim enhancing mass  measuring 3.1 x 2.6 x 2.8 cm (series 502, image 105) with an internal  focus of susceptibility artifact corresponding to the biopsy tissue  marker compatible with patient's biopsy-proven right breast cancer.  There is associated skin retraction without evidence of extent to the  skin surface or chest wall. There is no evidence of nipple  involvement. No additional abnormal enhancing masses or non-mass  enhancement.      No axillary or internal mammary lymphadenopathy is appreciated.      LEFT BREAST:  No suspicious mass or nonmass enhancement is identified.      No axillary or internal mammary lymphadenopathy is appreciated.      NON-BREAST FINDINGS:  None.      IMPRESSION:  1. Biopsy-proven right breast cancer as described above. Surgical  consultation is recommended.  2. No MRI evidence of malignancy in the left breast.  3. No axillary or internal mammary lymphadenopathy.      BI-RADS CATEGORY:  BI-RADS CATEGORY:  6 Known Biopsy-Proven Malignancy.  Recommendation:  Immediate Follow-up.  Recommended Date:  Immediate.  Laterality:  Right.      For any future breast imaging appointments, please call [phone]).      I personally reviewed the images/study and I agree with the breast  imaging fellow, Brendan Rosales D.O., findings as stated. This study  was interpreted at [facility],  [place], [place].      MACRO:  None      Signed by: Em Rosales 11/4/2024 9:03 AM  Dictation workstation:   [id number]      PATHOLOGY  Surgical Pathology Exam: [id number]  Order: [id number]   Collected 12/9/2024 11:29       Status: Final result       Visible to patient: No (inaccessible in [facility])       Dx: Malignant neoplasm of upper-outer sheila...    0 Result Notes       Component  Resulting Agency   FINAL DIAGNOSIS   A.  Right sentinel lymph node #1, biopsy:   -- One lymph node, negative for metastatic carcinoma (0/1)     B.  Right sentinel lymph node #2, biopsy:   -- One lymph node, negative for metastatic carcinoma (0/1)     C.  Right sentinel lymph node #3, biopsy:   -- One lymph node, negative for metastatic carcinoma (0/1)     D.  Right breast, lumpectomy:    -- Invasive ductal carcinoma, grade 2 (see synoptic report)   -- Ductal carcinoma in situ, intermediate nuclear grade  -- Biopsy site changes       Jake Zamudio MD, PhD        Electronically signed by Jake Zamudio MD PhD on 12/24/2024 at 1448      [facility]   By the signature on this report, the individual or group listed as making the Final Interpretation/Diagnosis certifies that they have reviewed this case.    Case Summary Report   INVASIVE CARCINOMA OF THE BREAST: Resection   8th Edition - Protocol posted: 6/19/2024INVASIVE CARCINOMA OF THE BREAST: RESECTION - All Specimens  SPECIMEN   Procedure  Excision (less than total mastectomy)   Specimen Laterality  Right   TUMOR   Histologic Type  Invasive carcinoma of no special type (ductal)   Histologic Grade (Silvia Histologic Score)     Glandular (Acinar) / Tubular Differentiation  Score 3   Nuclear Pleomorphism  Score 2   Mitotic Rate  Score 1   Overall Grade  Grade 2 (scores of 6 or 7)   Tumor Size  Greatest dimension of largest invasive focus (Millimeters): 38 mm mm   Tumor Focality  Single focus of invasive carcinoma   Ductal Carcinoma In Situ (DCIS)  Present     Negative for extensive intraductal component (EIC)   Size (Extent) of DCIS  Estimated size (extent) of DCIS is at least (Millimeters): 38 mm   Architectural  Patterns  Cribriform     Solid   Nuclear Grade  Grade II (intermediate)   Necrosis  Present, focal (small foci or single cell necrosis)   Lymphatic and / or Vascular Invasion  Not identified   Dermal Lymphatic and / or Vascular Invasion  Not identified   Microcalcifications  Present in invasive carcinoma     Present in non-neoplastic tissue   Treatment Effect in the Breast  No known presurgical therapy   MARGINS   Margin Status for Invasive Carcinoma  All margins negative for invasive carcinoma   Distance from Invasive Carcinoma to Closest Margin  Greater than: 4 mm   Margin Status for DCIS  All margins negative for DCIS   Distance from DCIS to Closest Margin  Greater than: 4 mm   REGIONAL LYMPH NODES   Regional Lymph Node Status  All regional lymph nodes negative for tumor   Total Number of Lymph Nodes Examined (sentinel and non-sentinel)  3   Number of [place] Nodes Examined  3   pTNM CLASSIFICATION (AJCC 8th Edition)   Reporting of pT, pN, and (when applicable) pM categories is based on information available to the pathologist at the time the report is issued. As per the AJCC (Chapter 1, 8th Ed.) it is the managing physician's responsibility to establish the final pathologic stage based upon all pertinent information, including but potentially not limited to this pathology report.   pT Category  pT2   pN Category  pN0   N Suffix  (sn)             ASSESSMENT / PLAN   Diagnoses and all orders for this visit:  Malignant neoplasm of upper-outer quadrant of right breast in female, estrogen receptor positive  -     BI mammo right diagnostic tomosynthesis; Future  Wound infection        Plan  Oct 2024: RIGHT: palp 3.3cm mass at 12 o'clock, 3cm from nipple; IDC; gr2; >95/90/neg; s/p lumpectomy with SLNB; pT2 (3.8cm) N0 (3 neg SLNs) M0 with negative margins    1.  Pathology of the core needle biopsy of the palpable right breast mass confirmed invasive ductal carcinoma.  The size of the mass is approximately 3.3 cm by  radiographic evaluation which would make this a T2 lesion.  There is some concern for possible overlying skin involvement as there is some dimpling in the skin just above the areolar region.  The MRI confirmed a 3.1 cm area of concern.  There does seem to be some retraction of the overlying skin, but no obvious involvement of cancer within the skin itself.  Radiographically, lymph nodes are negative.  Surgical treatment was undertaken with a right lumpectomy (palpable mass) and sentinel lymph node biopsy.  Surgical pathology shows a T2 lesion with clear margins.  2.  She still has a significant amount of methylene blue staining of the lateral aspect of the breast.  The redness that was seen on the breast was due to the methylene blue irritation and is improving.  The lateral aspect of the wound opened up which may be secondary to drainage from the surgical seroma versus underlying infection.  The wound is healing well and she will continue with once a day wet-to-dry dressing changes.  She is down to half of a 2 x 2 gauze in the open wound.  3.  Her cancer is ER/NV positive.  She has an appointment with Dr. Gregory (medical oncology) on 1/30/2025 to discuss endocrine therapy and possible chemotherapy.  4.  She has been seen by radiation oncology.  Will notify Dr. Verdin that he may proceed with scheduling for the planning CT for her XRT, and may start XRT when available.  The open wound on the breast ought to completely heal within the next 2 weeks.  5.  She does not have any family history of breast cancer; therefore, does not meet criteria for referral to genetics.  6.  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  Will keep her out of work until 2/14/2025 when she may return back to work without restrictions.  7.  She will be due for a right breast diagnostic mammogram in 5 months.  Return to the office after the mammogram.    Richa Felix,  MD, FACS  [facility] General Surgery  [address] [place] [address];   [address]  [place]  [address] [phone]

## 2025-01-22 NOTE — PATIENT INSTRUCTIONS
1.  Continue once a day wet-to-dry dressing changes to the right breast wound.  You should be changing the packing in the wound once a day.  You may change the top dry dressing more often if needed.  Will use the normal saline from the syringes to moisten the gauze before packing.  2.  Keep your appointment with medical oncology (Dr. Gregory) on 1/30/25 to discuss endocrine therapy and possible chemotherapy.  3.  A message has been sent to radiation oncology (Dr. Verdin) that you are cleared to start scheduling your planning CT for your XRT.  You may start XRT treatments when available.  4.  You will be due for a right diagnostic mammogram in 5 months.  Follow-up in Dr. Felix's office after this mammogram.  5.  Do your monthly self breast exams.  If you identify any abnormalities, please call Dr. Felix's office immediately.  [phone]  6.  You are cleared to return back to work on 2/14/2025 without restrictions.

## 2025-01-22 NOTE — ADDENDUM NOTE
Encounter addended by: Benjamín MUNIZ MD on: 1/22/2025 3:22 PM   Actions taken: Clinical Note Signed, Order list changed, Diagnosis association updated

## 2025-01-22 NOTE — LETTER
January 22, 2025     Patient: Vira Gordon   YOB: 1955   Date of Visit: 1/22/2025       To Whom It May Concern:    Vira Gordon was seen in the office today, 1/22/2025, for routine follow-up of her recent surgery.  She will require time out of work to completely heal.  However, she will be cleared for return back to work without restrictions on 2/14/2025.    If you have any questions, please contact our office at [phone].        Sincerely,    Richa Felix MD  [address] [place] [address]  [phone]

## 2025-01-23 ENCOUNTER — APPOINTMENT (OUTPATIENT)
Dept: HEMATOLOGY/ONCOLOGY | Facility: CLINIC | Age: 70
End: 2025-01-23
Payer: COMMERCIAL

## 2025-01-30 ENCOUNTER — HOSPITAL ENCOUNTER (OUTPATIENT)
Dept: RADIOLOGY | Facility: HOSPITAL | Age: 70
Discharge: HOME | End: 2025-01-30
Payer: COMMERCIAL

## 2025-01-30 ENCOUNTER — APPOINTMENT (OUTPATIENT)
Dept: HEMATOLOGY/ONCOLOGY | Facility: CLINIC | Age: 70
End: 2025-01-30
Payer: COMMERCIAL

## 2025-01-30 DIAGNOSIS — Z17.0 MALIGNANT NEOPLASM OF UPPER-OUTER QUADRANT OF RIGHT BREAST IN FEMALE, ESTROGEN RECEPTOR POSITIVE: ICD-10-CM

## 2025-01-30 DIAGNOSIS — C50.411 MALIGNANT NEOPLASM OF UPPER-OUTER QUADRANT OF RIGHT BREAST IN FEMALE, ESTROGEN RECEPTOR POSITIVE: ICD-10-CM

## 2025-01-30 PROCEDURE — 77334 RADIATION TREATMENT AID(S): CPT | Performed by: STUDENT IN AN ORGANIZED HEALTH CARE EDUCATION/TRAINING PROGRAM

## 2025-01-30 PROCEDURE — 77290 THER RAD SIMULAJ FIELD CPLX: CPT | Performed by: STUDENT IN AN ORGANIZED HEALTH CARE EDUCATION/TRAINING PROGRAM

## 2025-02-04 ENCOUNTER — HOSPITAL ENCOUNTER (OUTPATIENT)
Dept: RADIATION ONCOLOGY | Facility: CLINIC | Age: 70
Setting detail: RADIATION/ONCOLOGY SERIES
Discharge: HOME | End: 2025-02-04
Payer: COMMERCIAL

## 2025-02-04 PROCEDURE — 77300 RADIATION THERAPY DOSE PLAN: CPT | Performed by: STUDENT IN AN ORGANIZED HEALTH CARE EDUCATION/TRAINING PROGRAM

## 2025-02-04 PROCEDURE — 77295 3-D RADIOTHERAPY PLAN: CPT | Performed by: STUDENT IN AN ORGANIZED HEALTH CARE EDUCATION/TRAINING PROGRAM

## 2025-02-04 PROCEDURE — 77334 RADIATION TREATMENT AID(S): CPT | Performed by: STUDENT IN AN ORGANIZED HEALTH CARE EDUCATION/TRAINING PROGRAM

## 2025-02-07 ENCOUNTER — TELEPHONE (OUTPATIENT)
Dept: RADIATION ONCOLOGY | Facility: HOSPITAL | Age: 70
End: 2025-02-07
Payer: COMMERCIAL

## 2025-02-11 ENCOUNTER — HOSPITAL ENCOUNTER (OUTPATIENT)
Dept: RADIATION ONCOLOGY | Facility: CLINIC | Age: 70
Setting detail: RADIATION/ONCOLOGY SERIES
Discharge: HOME | End: 2025-02-11
Payer: COMMERCIAL

## 2025-02-11 DIAGNOSIS — C50.811 MALIGNANT NEOPLASM OF OVERLAPPING SITES OF RIGHT FEMALE BREAST: ICD-10-CM

## 2025-02-11 LAB
RAD ONC MSQ ACTUAL FRACTIONS DELIVERED: 1
RAD ONC MSQ ACTUAL SESSION DELIVERED DOSE: 267 CGRAY
RAD ONC MSQ ACTUAL TOTAL DOSE: 267 CGRAY
RAD ONC MSQ ELAPSED DAYS: 0
RAD ONC MSQ LAST DATE: NORMAL
RAD ONC MSQ PRESCRIBED FRACTIONAL DOSE: 267 CGRAY
RAD ONC MSQ PRESCRIBED NUMBER OF FRACTIONS: 15
RAD ONC MSQ PRESCRIBED TECHNIQUE: NORMAL
RAD ONC MSQ PRESCRIBED TOTAL DOSE: 4005 CGRAY
RAD ONC MSQ PRESCRIPTION PATTERN COMMENT: NORMAL
RAD ONC MSQ START DATE: NORMAL
RAD ONC MSQ TREATMENT COURSE NUMBER: 1
RAD ONC MSQ TREATMENT SITE: NORMAL

## 2025-02-11 PROCEDURE — 77336 RADIATION PHYSICS CONSULT: CPT | Performed by: STUDENT IN AN ORGANIZED HEALTH CARE EDUCATION/TRAINING PROGRAM

## 2025-02-11 PROCEDURE — 77280 THER RAD SIMULAJ FIELD SMPL: CPT | Performed by: STUDENT IN AN ORGANIZED HEALTH CARE EDUCATION/TRAINING PROGRAM

## 2025-02-11 PROCEDURE — 77412 RADIATION TX DELIVERY LVL 3: CPT | Performed by: STUDENT IN AN ORGANIZED HEALTH CARE EDUCATION/TRAINING PROGRAM

## 2025-02-12 ENCOUNTER — HOSPITAL ENCOUNTER (OUTPATIENT)
Dept: RADIATION ONCOLOGY | Facility: CLINIC | Age: 70
Setting detail: RADIATION/ONCOLOGY SERIES
Discharge: HOME | End: 2025-02-12
Payer: COMMERCIAL

## 2025-02-12 DIAGNOSIS — Z51.0 ENCOUNTER FOR ANTINEOPLASTIC RADIATION THERAPY: ICD-10-CM

## 2025-02-12 DIAGNOSIS — C50.811 MALIGNANT NEOPLASM OF OVERLAPPING SITES OF RIGHT FEMALE BREAST: ICD-10-CM

## 2025-02-12 LAB
RAD ONC MSQ ACTUAL FRACTIONS DELIVERED: 2
RAD ONC MSQ ACTUAL SESSION DELIVERED DOSE: 267 CGRAY
RAD ONC MSQ ACTUAL TOTAL DOSE: 534 CGRAY
RAD ONC MSQ ELAPSED DAYS: 1
RAD ONC MSQ LAST DATE: NORMAL
RAD ONC MSQ PRESCRIBED FRACTIONAL DOSE: 267 CGRAY
RAD ONC MSQ PRESCRIBED NUMBER OF FRACTIONS: 15
RAD ONC MSQ PRESCRIBED TECHNIQUE: NORMAL
RAD ONC MSQ PRESCRIBED TOTAL DOSE: 4005 CGRAY
RAD ONC MSQ PRESCRIPTION PATTERN COMMENT: NORMAL
RAD ONC MSQ START DATE: NORMAL
RAD ONC MSQ TREATMENT COURSE NUMBER: 1
RAD ONC MSQ TREATMENT SITE: NORMAL

## 2025-02-12 PROCEDURE — 77412 RADIATION TX DELIVERY LVL 3: CPT | Performed by: STUDENT IN AN ORGANIZED HEALTH CARE EDUCATION/TRAINING PROGRAM

## 2025-02-13 ENCOUNTER — HOSPITAL ENCOUNTER (OUTPATIENT)
Dept: RADIATION ONCOLOGY | Facility: CLINIC | Age: 70
Setting detail: RADIATION/ONCOLOGY SERIES
Discharge: HOME | End: 2025-02-13
Payer: COMMERCIAL

## 2025-02-13 VITALS
TEMPERATURE: 97.2 F | DIASTOLIC BLOOD PRESSURE: 93 MMHG | RESPIRATION RATE: 16 BRPM | WEIGHT: 228 LBS | SYSTOLIC BLOOD PRESSURE: 159 MMHG | BODY MASS INDEX: 41.7 KG/M2 | HEART RATE: 84 BPM | OXYGEN SATURATION: 94 %

## 2025-02-13 DIAGNOSIS — Z17.0 MALIGNANT NEOPLASM OF UPPER-OUTER QUADRANT OF RIGHT BREAST IN FEMALE, ESTROGEN RECEPTOR POSITIVE: Primary | ICD-10-CM

## 2025-02-13 DIAGNOSIS — C50.411 MALIGNANT NEOPLASM OF UPPER-OUTER QUADRANT OF RIGHT BREAST IN FEMALE, ESTROGEN RECEPTOR POSITIVE: Primary | ICD-10-CM

## 2025-02-13 DIAGNOSIS — Z51.0 ENCOUNTER FOR ANTINEOPLASTIC RADIATION THERAPY: ICD-10-CM

## 2025-02-13 DIAGNOSIS — C50.811 MALIGNANT NEOPLASM OF OVERLAPPING SITES OF RIGHT FEMALE BREAST: ICD-10-CM

## 2025-02-13 LAB
RAD ONC MSQ ACTUAL FRACTIONS DELIVERED: 3
RAD ONC MSQ ACTUAL SESSION DELIVERED DOSE: 267 CGRAY
RAD ONC MSQ ACTUAL TOTAL DOSE: 801 CGRAY
RAD ONC MSQ ELAPSED DAYS: 2
RAD ONC MSQ LAST DATE: NORMAL
RAD ONC MSQ PRESCRIBED FRACTIONAL DOSE: 267 CGRAY
RAD ONC MSQ PRESCRIBED NUMBER OF FRACTIONS: 15
RAD ONC MSQ PRESCRIBED TECHNIQUE: NORMAL
RAD ONC MSQ PRESCRIBED TOTAL DOSE: 4005 CGRAY
RAD ONC MSQ PRESCRIPTION PATTERN COMMENT: NORMAL
RAD ONC MSQ START DATE: NORMAL
RAD ONC MSQ TREATMENT COURSE NUMBER: 1
RAD ONC MSQ TREATMENT SITE: NORMAL

## 2025-02-13 PROCEDURE — 77412 RADIATION TX DELIVERY LVL 3: CPT | Performed by: STUDENT IN AN ORGANIZED HEALTH CARE EDUCATION/TRAINING PROGRAM

## 2025-02-13 ASSESSMENT — PATIENT HEALTH QUESTIONNAIRE - PHQ9
SUM OF ALL RESPONSES TO PHQ9 QUESTIONS 1 & 2: 0
1. LITTLE INTEREST OR PLEASURE IN DOING THINGS: NOT AT ALL
2. FEELING DOWN, DEPRESSED OR HOPELESS: NOT AT ALL

## 2025-02-13 ASSESSMENT — PAIN SCALES - GENERAL: PAINLEVEL_OUTOF10: 0-NO PAIN

## 2025-02-13 ASSESSMENT — ENCOUNTER SYMPTOMS
LOSS OF SENSATION IN FEET: 0
DEPRESSION: 0
OCCASIONAL FEELINGS OF UNSTEADINESS: 0

## 2025-02-13 ASSESSMENT — COLUMBIA-SUICIDE SEVERITY RATING SCALE - C-SSRS
2. HAVE YOU ACTUALLY HAD ANY THOUGHTS OF KILLING YOURSELF?: NO
6. HAVE YOU EVER DONE ANYTHING, STARTED TO DO ANYTHING, OR PREPARED TO DO ANYTHING TO END YOUR LIFE?: NO
1. IN THE PAST MONTH, HAVE YOU WISHED YOU WERE DEAD OR WISHED YOU COULD GO TO SLEEP AND NOT WAKE UP?: NO

## 2025-02-13 NOTE — PROGRESS NOTES
Radiation Oncology On Treatment Visit    Patient Name:  Vira Gordon  MRN:  [id number]  :  1955    Referring Provider: eBnjamín Verdin MD  Primary Care Provider: Aurora Delgadillo MD  Care Team: Patient Care Team:  Aurora Delgadillo MD as PCP - General (Family Medicine)  Samina Hernandez, RN as Registered Nurse (Radiology)    Date of Service: 2025     Diagnosis:   Specialty Problems          Radiation Oncology Problems    Malignant neoplasm of upper-outer quadrant of right breast in female, estrogen receptor positive         Treatment Summary:  3D CRT: Right Breast    Treatment Period Technique Fraction Dose Fractions Total Dose   Course 1 2025-2025  (days elapsed: 2)         Rt Breast 2025-2025 3D 267 / 267 cGy 3 / 15 801 / 4,005 cGy     SUBJECTIVE:   Patient started right breast radiation on Tuesday and feels well so far. Dr. Felix cleared her to start radiation since her incision has closed and she is no longer doing wound packing. She is putting Neosporin over the area regularly. She has some slight itching around the area.      OBJECTIVE:   Vital Signs:  BP (!) 159/93   Pulse 84   Temp 36.2 °C (97.2 °F) (Temporal)   Resp 16   Wt 103 kg (228 lb)   SpO2 94%   BMI 41.70 kg/m²     Other Pertinent Findings:   Right UOQ lumpectomy incision closed with a scab, with slight surrounding erythema. No bleeding or discharge from the incision.     Toxicity Assessment          2025    13:25   Toxicity Assessment   Adverse Events Reviewed (WDL) No (Exceptions to WDL)   Treatment Site Breast   Anorexia Grade 0   Anxiety Grade 0   Dehydration Grade 0   Depression Grade 0   Dermatitis Radiation Grade 0       skintegrity given   Diarrhea Grade 0   Fatigue Grade 0   Fibrosis Deep Connective Tissue Grade 0   Fracture Grade 0   Nausea Grade 0   Pain Grade 0       occasional twinges from surgery   Treatment Related Secondary Malignancy Grade 0   Tumor Pain Grade 0   Vomiting Grade 0    Dysphagia Grade 0   Esophagitis Grade 0   Lymphedema Grade 0        Assessment / Plan:  The patient is tolerating radiation therapy as anticipated.  Continue per current treatment plan.       I reviewed common acute radiation side effects and provided skin moisturizer.

## 2025-02-14 ENCOUNTER — HOSPITAL ENCOUNTER (OUTPATIENT)
Dept: RADIATION ONCOLOGY | Facility: CLINIC | Age: 70
Setting detail: RADIATION/ONCOLOGY SERIES
Discharge: HOME | End: 2025-02-14
Payer: COMMERCIAL

## 2025-02-14 DIAGNOSIS — C50.811 MALIGNANT NEOPLASM OF OVERLAPPING SITES OF RIGHT FEMALE BREAST: ICD-10-CM

## 2025-02-14 DIAGNOSIS — Z51.0 ENCOUNTER FOR ANTINEOPLASTIC RADIATION THERAPY: ICD-10-CM

## 2025-02-14 LAB
RAD ONC MSQ ACTUAL FRACTIONS DELIVERED: 4
RAD ONC MSQ ACTUAL SESSION DELIVERED DOSE: 267 CGRAY
RAD ONC MSQ ACTUAL TOTAL DOSE: 1068 CGRAY
RAD ONC MSQ ELAPSED DAYS: 3
RAD ONC MSQ LAST DATE: NORMAL
RAD ONC MSQ PRESCRIBED FRACTIONAL DOSE: 267 CGRAY
RAD ONC MSQ PRESCRIBED NUMBER OF FRACTIONS: 15
RAD ONC MSQ PRESCRIBED TECHNIQUE: NORMAL
RAD ONC MSQ PRESCRIBED TOTAL DOSE: 4005 CGRAY
RAD ONC MSQ PRESCRIPTION PATTERN COMMENT: NORMAL
RAD ONC MSQ START DATE: NORMAL
RAD ONC MSQ TREATMENT COURSE NUMBER: 1
RAD ONC MSQ TREATMENT SITE: NORMAL

## 2025-02-14 PROCEDURE — 77412 RADIATION TX DELIVERY LVL 3: CPT | Performed by: STUDENT IN AN ORGANIZED HEALTH CARE EDUCATION/TRAINING PROGRAM

## 2025-02-17 ENCOUNTER — HOSPITAL ENCOUNTER (OUTPATIENT)
Dept: RADIATION ONCOLOGY | Facility: CLINIC | Age: 70
Setting detail: RADIATION/ONCOLOGY SERIES
Discharge: HOME | End: 2025-02-17
Payer: COMMERCIAL

## 2025-02-17 DIAGNOSIS — Z51.0 ENCOUNTER FOR ANTINEOPLASTIC RADIATION THERAPY: ICD-10-CM

## 2025-02-17 DIAGNOSIS — C50.811 MALIGNANT NEOPLASM OF OVERLAPPING SITES OF RIGHT FEMALE BREAST: ICD-10-CM

## 2025-02-17 LAB
RAD ONC MSQ ACTUAL FRACTIONS DELIVERED: 5
RAD ONC MSQ ACTUAL SESSION DELIVERED DOSE: 267 CGRAY
RAD ONC MSQ ACTUAL TOTAL DOSE: 1335 CGRAY
RAD ONC MSQ ELAPSED DAYS: 6
RAD ONC MSQ LAST DATE: NORMAL
RAD ONC MSQ PRESCRIBED FRACTIONAL DOSE: 267 CGRAY
RAD ONC MSQ PRESCRIBED NUMBER OF FRACTIONS: 15
RAD ONC MSQ PRESCRIBED TECHNIQUE: NORMAL
RAD ONC MSQ PRESCRIBED TOTAL DOSE: 4005 CGRAY
RAD ONC MSQ PRESCRIPTION PATTERN COMMENT: NORMAL
RAD ONC MSQ START DATE: NORMAL
RAD ONC MSQ TREATMENT COURSE NUMBER: 1
RAD ONC MSQ TREATMENT SITE: NORMAL

## 2025-02-17 PROCEDURE — 77412 RADIATION TX DELIVERY LVL 3: CPT | Performed by: STUDENT IN AN ORGANIZED HEALTH CARE EDUCATION/TRAINING PROGRAM

## 2025-02-18 ENCOUNTER — HOSPITAL ENCOUNTER (OUTPATIENT)
Dept: RADIATION ONCOLOGY | Facility: CLINIC | Age: 70
Setting detail: RADIATION/ONCOLOGY SERIES
Discharge: HOME | End: 2025-02-18
Payer: COMMERCIAL

## 2025-02-18 DIAGNOSIS — Z51.0 ENCOUNTER FOR ANTINEOPLASTIC RADIATION THERAPY: ICD-10-CM

## 2025-02-18 DIAGNOSIS — C50.811 MALIGNANT NEOPLASM OF OVERLAPPING SITES OF RIGHT FEMALE BREAST: ICD-10-CM

## 2025-02-18 LAB
RAD ONC MSQ ACTUAL FRACTIONS DELIVERED: 6
RAD ONC MSQ ACTUAL SESSION DELIVERED DOSE: 267 CGRAY
RAD ONC MSQ ACTUAL TOTAL DOSE: 1602 CGRAY
RAD ONC MSQ ELAPSED DAYS: 7
RAD ONC MSQ LAST DATE: NORMAL
RAD ONC MSQ PRESCRIBED FRACTIONAL DOSE: 267 CGRAY
RAD ONC MSQ PRESCRIBED NUMBER OF FRACTIONS: 15
RAD ONC MSQ PRESCRIBED TECHNIQUE: NORMAL
RAD ONC MSQ PRESCRIBED TOTAL DOSE: 4005 CGRAY
RAD ONC MSQ PRESCRIPTION PATTERN COMMENT: NORMAL
RAD ONC MSQ START DATE: NORMAL
RAD ONC MSQ TREATMENT COURSE NUMBER: 1
RAD ONC MSQ TREATMENT SITE: NORMAL

## 2025-02-18 PROCEDURE — 77336 RADIATION PHYSICS CONSULT: CPT | Performed by: STUDENT IN AN ORGANIZED HEALTH CARE EDUCATION/TRAINING PROGRAM

## 2025-02-18 PROCEDURE — 77417 THER RADIOLOGY PORT IMAGE(S): CPT | Performed by: STUDENT IN AN ORGANIZED HEALTH CARE EDUCATION/TRAINING PROGRAM

## 2025-02-18 PROCEDURE — 77412 RADIATION TX DELIVERY LVL 3: CPT | Performed by: STUDENT IN AN ORGANIZED HEALTH CARE EDUCATION/TRAINING PROGRAM

## 2025-02-19 ENCOUNTER — HOSPITAL ENCOUNTER (OUTPATIENT)
Dept: RADIATION ONCOLOGY | Facility: CLINIC | Age: 70
Setting detail: RADIATION/ONCOLOGY SERIES
Discharge: HOME | End: 2025-02-19
Payer: COMMERCIAL

## 2025-02-19 DIAGNOSIS — Z51.0 ENCOUNTER FOR ANTINEOPLASTIC RADIATION THERAPY: ICD-10-CM

## 2025-02-19 DIAGNOSIS — C50.811 MALIGNANT NEOPLASM OF OVERLAPPING SITES OF RIGHT FEMALE BREAST: ICD-10-CM

## 2025-02-19 LAB
RAD ONC MSQ ACTUAL FRACTIONS DELIVERED: 7
RAD ONC MSQ ACTUAL SESSION DELIVERED DOSE: 267 CGRAY
RAD ONC MSQ ACTUAL TOTAL DOSE: 1869 CGRAY
RAD ONC MSQ ELAPSED DAYS: 8
RAD ONC MSQ LAST DATE: NORMAL
RAD ONC MSQ PRESCRIBED FRACTIONAL DOSE: 267 CGRAY
RAD ONC MSQ PRESCRIBED NUMBER OF FRACTIONS: 15
RAD ONC MSQ PRESCRIBED TECHNIQUE: NORMAL
RAD ONC MSQ PRESCRIBED TOTAL DOSE: 4005 CGRAY
RAD ONC MSQ PRESCRIPTION PATTERN COMMENT: NORMAL
RAD ONC MSQ START DATE: NORMAL
RAD ONC MSQ TREATMENT COURSE NUMBER: 1
RAD ONC MSQ TREATMENT SITE: NORMAL

## 2025-02-19 PROCEDURE — 77412 RADIATION TX DELIVERY LVL 3: CPT | Performed by: STUDENT IN AN ORGANIZED HEALTH CARE EDUCATION/TRAINING PROGRAM

## 2025-02-20 ENCOUNTER — HOSPITAL ENCOUNTER (OUTPATIENT)
Dept: RADIATION ONCOLOGY | Facility: CLINIC | Age: 70
Setting detail: RADIATION/ONCOLOGY SERIES
Discharge: HOME | End: 2025-02-20
Payer: COMMERCIAL

## 2025-02-20 VITALS
BODY MASS INDEX: 41.77 KG/M2 | OXYGEN SATURATION: 98 % | HEART RATE: 72 BPM | WEIGHT: 228.4 LBS | TEMPERATURE: 97.5 F | SYSTOLIC BLOOD PRESSURE: 143 MMHG | DIASTOLIC BLOOD PRESSURE: 87 MMHG | RESPIRATION RATE: 16 BRPM

## 2025-02-20 DIAGNOSIS — Z17.0 MALIGNANT NEOPLASM OF UPPER-OUTER QUADRANT OF RIGHT BREAST IN FEMALE, ESTROGEN RECEPTOR POSITIVE: Primary | ICD-10-CM

## 2025-02-20 DIAGNOSIS — C50.411 MALIGNANT NEOPLASM OF UPPER-OUTER QUADRANT OF RIGHT BREAST IN FEMALE, ESTROGEN RECEPTOR POSITIVE: Primary | ICD-10-CM

## 2025-02-20 DIAGNOSIS — Z51.0 ENCOUNTER FOR ANTINEOPLASTIC RADIATION THERAPY: ICD-10-CM

## 2025-02-20 DIAGNOSIS — C50.811 MALIGNANT NEOPLASM OF OVERLAPPING SITES OF RIGHT FEMALE BREAST: ICD-10-CM

## 2025-02-20 LAB
RAD ONC MSQ ACTUAL FRACTIONS DELIVERED: 8
RAD ONC MSQ ACTUAL SESSION DELIVERED DOSE: 267 CGRAY
RAD ONC MSQ ACTUAL TOTAL DOSE: 2136 CGRAY
RAD ONC MSQ ELAPSED DAYS: 9
RAD ONC MSQ LAST DATE: NORMAL
RAD ONC MSQ PRESCRIBED FRACTIONAL DOSE: 267 CGRAY
RAD ONC MSQ PRESCRIBED NUMBER OF FRACTIONS: 15
RAD ONC MSQ PRESCRIBED TECHNIQUE: NORMAL
RAD ONC MSQ PRESCRIBED TOTAL DOSE: 4005 CGRAY
RAD ONC MSQ PRESCRIPTION PATTERN COMMENT: NORMAL
RAD ONC MSQ START DATE: NORMAL
RAD ONC MSQ TREATMENT COURSE NUMBER: 1
RAD ONC MSQ TREATMENT SITE: NORMAL

## 2025-02-20 PROCEDURE — 77412 RADIATION TX DELIVERY LVL 3: CPT | Performed by: STUDENT IN AN ORGANIZED HEALTH CARE EDUCATION/TRAINING PROGRAM

## 2025-02-20 ASSESSMENT — PATIENT HEALTH QUESTIONNAIRE - PHQ9
2. FEELING DOWN, DEPRESSED OR HOPELESS: NOT AT ALL
SUM OF ALL RESPONSES TO PHQ9 QUESTIONS 1 & 2: 0
1. LITTLE INTEREST OR PLEASURE IN DOING THINGS: NOT AT ALL

## 2025-02-20 ASSESSMENT — COLUMBIA-SUICIDE SEVERITY RATING SCALE - C-SSRS
2. HAVE YOU ACTUALLY HAD ANY THOUGHTS OF KILLING YOURSELF?: NO
1. IN THE PAST MONTH, HAVE YOU WISHED YOU WERE DEAD OR WISHED YOU COULD GO TO SLEEP AND NOT WAKE UP?: NO
6. HAVE YOU EVER DONE ANYTHING, STARTED TO DO ANYTHING, OR PREPARED TO DO ANYTHING TO END YOUR LIFE?: NO

## 2025-02-20 ASSESSMENT — PAIN SCALES - GENERAL: PAINLEVEL_OUTOF10: 0-NO PAIN

## 2025-02-20 ASSESSMENT — ENCOUNTER SYMPTOMS
LOSS OF SENSATION IN FEET: 0
DEPRESSION: 0
OCCASIONAL FEELINGS OF UNSTEADINESS: 0

## 2025-02-20 NOTE — PROGRESS NOTES
Radiation Oncology On Treatment Visit    Patient Name:  Vira Gordon  MRN:  [id number]  :  1955    Referring Provider: Benjamín Verdin MD  Primary Care Provider: Aurora Delgadillo MD  Care Team: Patient Care Team:  Aurora Delgadillo MD as PCP - General (Family Medicine)  Samina Hernandez, RN as Registered Nurse (Radiology)    Date of Service: 2025     Diagnosis:   Specialty Problems          Radiation Oncology Problems    Malignant neoplasm of upper-outer quadrant of right breast in female, estrogen receptor positive         Treatment Summary:  3D CRT: Right Breast    Treatment Period Technique Fraction Dose Fractions Total Dose   Course 1 2025-2025  (days elapsed: 9)         Rt Breast 2025-2025 3D 267 / 267 cGy 8 / 15 2136 / 4,005 cGy     SUBJECTIVE:   Patient feels well overall besides some more fatigue compared to last week. The lumpectomy incision remains closed with no bleeding/drainage. She has noticed some itching and redness but no significant tenderness or skin peeling.      OBJECTIVE:   Vital Signs:  /87   Pulse 72   Temp 36.4 °C (97.5 °F) (Temporal)   Resp 16   Wt 104 kg (228 lb 6.4 oz)   SpO2 98%   BMI 41.77 kg/m²     Other Pertinent Findings:   Mild erythema over right breast without desquamation. Lumpectomy incision closed with no drainage.    Toxicity Assessment          2025    13:25 2025    13:24   Toxicity Assessment   Adverse Events Reviewed (WDL) No (Exceptions to WDL) No (Exceptions to WDL)   Treatment Site Breast Breast   Anorexia Grade 0 Grade 0   Anxiety Grade 0 Grade 0   Dehydration Grade 0 Grade 0   Depression Grade 0 Grade 0   Dermatitis Radiation Grade 0       skintegrity given Grade 0       states skin doing well, slightly itchy. she will try some otc hydrocortisone cream   Diarrhea Grade 0 Grade 0   Fatigue Grade 0 Grade 1       starting to feel a little tired   Fibrosis Deep Connective Tissue Grade 0 Grade 0   Fracture Grade 0  Grade 0   Nausea Grade 0 Grade 0   Pain Grade 0       occasional twinges from surgery Grade 0   Treatment Related Secondary Malignancy Grade 0 Grade 0   Tumor Pain Grade 0 Grade 0   Vomiting Grade 0 Grade 0   Dysphagia Grade 0    Esophagitis Grade 0    Lymphedema Grade 0 Grade 0        Assessment / Plan:  The patient is tolerating radiation therapy as anticipated.  Continue per current treatment plan.

## 2025-02-21 ENCOUNTER — HOSPITAL ENCOUNTER (OUTPATIENT)
Dept: RADIATION ONCOLOGY | Facility: CLINIC | Age: 70
Setting detail: RADIATION/ONCOLOGY SERIES
Discharge: HOME | End: 2025-02-21
Payer: COMMERCIAL

## 2025-02-21 ENCOUNTER — PATIENT OUTREACH (OUTPATIENT)
Dept: HEMATOLOGY/ONCOLOGY | Facility: CLINIC | Age: 70
End: 2025-02-21
Payer: COMMERCIAL

## 2025-02-21 DIAGNOSIS — C50.811 MALIGNANT NEOPLASM OF OVERLAPPING SITES OF RIGHT FEMALE BREAST: ICD-10-CM

## 2025-02-21 DIAGNOSIS — Z51.0 ENCOUNTER FOR ANTINEOPLASTIC RADIATION THERAPY: ICD-10-CM

## 2025-02-21 LAB
RAD ONC MSQ ACTUAL FRACTIONS DELIVERED: 9
RAD ONC MSQ ACTUAL SESSION DELIVERED DOSE: 267 CGRAY
RAD ONC MSQ ACTUAL TOTAL DOSE: 2403 CGRAY
RAD ONC MSQ ELAPSED DAYS: 10
RAD ONC MSQ LAST DATE: NORMAL
RAD ONC MSQ PRESCRIBED FRACTIONAL DOSE: 267 CGRAY
RAD ONC MSQ PRESCRIBED NUMBER OF FRACTIONS: 15
RAD ONC MSQ PRESCRIBED TECHNIQUE: NORMAL
RAD ONC MSQ PRESCRIBED TOTAL DOSE: 4005 CGRAY
RAD ONC MSQ PRESCRIPTION PATTERN COMMENT: NORMAL
RAD ONC MSQ START DATE: NORMAL
RAD ONC MSQ TREATMENT COURSE NUMBER: 1
RAD ONC MSQ TREATMENT SITE: NORMAL

## 2025-02-21 PROCEDURE — 77412 RADIATION TX DELIVERY LVL 3: CPT | Performed by: STUDENT IN AN ORGANIZED HEALTH CARE EDUCATION/TRAINING PROGRAM

## 2025-02-21 SDOH — HEALTH STABILITY: PHYSICAL HEALTH: ON AVERAGE, HOW MANY DAYS PER WEEK DO YOU ENGAGE IN MODERATE TO STRENUOUS EXERCISE (LIKE A BRISK WALK)?: 0 DAYS

## 2025-02-21 SDOH — ECONOMIC STABILITY: INCOME INSECURITY: IN THE LAST 12 MONTHS, WAS THERE A TIME WHEN YOU WERE NOT ABLE TO PAY THE MORTGAGE OR RENT ON TIME?: NO

## 2025-02-21 SDOH — ECONOMIC STABILITY: GENERAL
WHICH OF THE FOLLOWING DO YOU KNOW HOW TO USE AND HAVE ACCESS TO EVERY DAY? (CHOOSE ALL THAT APPLY): SMARTPHONE WITH CELLULAR DATA PLAN

## 2025-02-21 SDOH — ECONOMIC STABILITY: FOOD INSECURITY: WITHIN THE PAST 12 MONTHS, YOU WORRIED THAT YOUR FOOD WOULD RUN OUT BEFORE YOU GOT MONEY TO BUY MORE.: NEVER TRUE

## 2025-02-21 SDOH — ECONOMIC STABILITY: TRANSPORTATION INSECURITY
IN THE PAST 12 MONTHS, HAS LACK OF TRANSPORTATION KEPT YOU FROM MEETINGS, WORK, OR FROM GETTING THINGS NEEDED FOR DAILY LIVING?: NO

## 2025-02-21 SDOH — HEALTH STABILITY: PHYSICAL HEALTH: ON AVERAGE, HOW MANY MINUTES DO YOU ENGAGE IN EXERCISE AT THIS LEVEL?: 30 MIN

## 2025-02-21 SDOH — ECONOMIC STABILITY: FOOD INSECURITY: WITHIN THE PAST 12 MONTHS, THE FOOD YOU BOUGHT JUST DIDN'T LAST AND YOU DIDN'T HAVE MONEY TO GET MORE.: NEVER TRUE

## 2025-02-21 SDOH — ECONOMIC STABILITY: TRANSPORTATION INSECURITY
IN THE PAST 12 MONTHS, HAS THE LACK OF TRANSPORTATION KEPT YOU FROM MEDICAL APPOINTMENTS OR FROM GETTING MEDICATIONS?: NO

## 2025-02-21 SDOH — ECONOMIC STABILITY: GENERAL
WHICH OF THE FOLLOWING WOULD YOU LIKE TO GET CONNECTED TO IN ORDER TO RECEIVE A DISCOUNT OR FOR FREE? (CHOOSE ALL THAT APPLY): PATIENT DECLINED

## 2025-02-21 ASSESSMENT — SOCIAL DETERMINANTS OF HEALTH (SDOH)
IN THE PAST 12 MONTHS, HAS THE ELECTRIC, GAS, OIL, OR WATER COMPANY THREATENED TO SHUT OFF SERVICE IN YOUR HOME?: NO
WITHIN THE LAST YEAR, HAVE YOU BEEN AFRAID OF YOUR PARTNER OR EX-PARTNER?: NO
HOW OFTEN DO YOU ATTENT MEETINGS OF THE CLUB OR ORGANIZATION YOU BELONG TO?: NEVER
WITHIN THE LAST YEAR, HAVE TO BEEN RAPED OR FORCED TO HAVE ANY KIND OF SEXUAL ACTIVITY BY YOUR PARTNER OR EX-PARTNER?: NO
IN A TYPICAL WEEK, HOW MANY TIMES DO YOU TALK ON THE PHONE WITH FAMILY, FRIENDS, OR NEIGHBORS?: MORE THAN THREE TIMES A WEEK
DO YOU BELONG TO ANY CLUBS OR ORGANIZATIONS SUCH AS CHURCH GROUPS UNIONS, FRATERNAL OR ATHLETIC GROUPS, OR SCHOOL GROUPS?: NO
WITHIN THE LAST YEAR, HAVE YOU BEEN KICKED, HIT, SLAPPED, OR OTHERWISE PHYSICALLY HURT BY YOUR PARTNER OR EX-PARTNER?: NO
WITHIN THE LAST YEAR, HAVE YOU BEEN HUMILIATED OR EMOTIONALLY ABUSED IN OTHER WAYS BY YOUR PARTNER OR EX-PARTNER?: NO
HOW OFTEN DO YOU GET TOGETHER WITH FRIENDS OR RELATIVES?: TWICE A WEEK
HOW HARD IS IT FOR YOU TO PAY FOR THE VERY BASICS LIKE FOOD, HOUSING, MEDICAL CARE, AND HEATING?: NOT HARD AT ALL
HOW OFTEN DO YOU ATTEND CHURCH OR RELIGIOUS SERVICES?: NEVER

## 2025-02-21 ASSESSMENT — LIFESTYLE VARIABLES
SKIP TO QUESTIONS 9-10: 1
HOW OFTEN DO YOU HAVE A DRINK CONTAINING ALCOHOL: 2-4 TIMES A MONTH
HOW MANY STANDARD DRINKS CONTAINING ALCOHOL DO YOU HAVE ON A TYPICAL DAY: 1 OR 2
HOW OFTEN DO YOU HAVE SIX OR MORE DRINKS ON ONE OCCASION: NEVER
AUDIT-C TOTAL SCORE: 2

## 2025-02-24 ENCOUNTER — HOSPITAL ENCOUNTER (OUTPATIENT)
Dept: RADIATION ONCOLOGY | Facility: CLINIC | Age: 70
Setting detail: RADIATION/ONCOLOGY SERIES
Discharge: HOME | End: 2025-02-24
Payer: COMMERCIAL

## 2025-02-24 DIAGNOSIS — Z51.0 ENCOUNTER FOR ANTINEOPLASTIC RADIATION THERAPY: ICD-10-CM

## 2025-02-24 DIAGNOSIS — C50.811 MALIGNANT NEOPLASM OF OVERLAPPING SITES OF RIGHT FEMALE BREAST: ICD-10-CM

## 2025-02-24 LAB
RAD ONC MSQ ACTUAL FRACTIONS DELIVERED: 10
RAD ONC MSQ ACTUAL SESSION DELIVERED DOSE: 267 CGRAY
RAD ONC MSQ ACTUAL TOTAL DOSE: 2670 CGRAY
RAD ONC MSQ ELAPSED DAYS: 13
RAD ONC MSQ LAST DATE: NORMAL
RAD ONC MSQ PRESCRIBED FRACTIONAL DOSE: 267 CGRAY
RAD ONC MSQ PRESCRIBED NUMBER OF FRACTIONS: 15
RAD ONC MSQ PRESCRIBED TECHNIQUE: NORMAL
RAD ONC MSQ PRESCRIBED TOTAL DOSE: 4005 CGRAY
RAD ONC MSQ PRESCRIPTION PATTERN COMMENT: NORMAL
RAD ONC MSQ START DATE: NORMAL
RAD ONC MSQ TREATMENT COURSE NUMBER: 1
RAD ONC MSQ TREATMENT SITE: NORMAL

## 2025-02-24 PROCEDURE — 77412 RADIATION TX DELIVERY LVL 3: CPT | Performed by: STUDENT IN AN ORGANIZED HEALTH CARE EDUCATION/TRAINING PROGRAM

## 2025-02-25 ENCOUNTER — HOSPITAL ENCOUNTER (OUTPATIENT)
Dept: RADIATION ONCOLOGY | Facility: CLINIC | Age: 70
Setting detail: RADIATION/ONCOLOGY SERIES
Discharge: HOME | End: 2025-02-25
Payer: COMMERCIAL

## 2025-02-25 ENCOUNTER — OFFICE VISIT (OUTPATIENT)
Dept: HEMATOLOGY/ONCOLOGY | Facility: CLINIC | Age: 70
End: 2025-02-25
Payer: COMMERCIAL

## 2025-02-25 VITALS
TEMPERATURE: 97.3 F | SYSTOLIC BLOOD PRESSURE: 131 MMHG | BODY MASS INDEX: 40.51 KG/M2 | DIASTOLIC BLOOD PRESSURE: 83 MMHG | HEART RATE: 92 BPM | WEIGHT: 228.62 LBS | RESPIRATION RATE: 17 BRPM | OXYGEN SATURATION: 95 % | HEIGHT: 63 IN

## 2025-02-25 DIAGNOSIS — C50.811 MALIGNANT NEOPLASM OF OVERLAPPING SITES OF RIGHT FEMALE BREAST: ICD-10-CM

## 2025-02-25 DIAGNOSIS — Z17.0 MALIGNANT NEOPLASM OF UPPER-OUTER QUADRANT OF RIGHT BREAST IN FEMALE, ESTROGEN RECEPTOR POSITIVE: ICD-10-CM

## 2025-02-25 DIAGNOSIS — C50.411 MALIGNANT NEOPLASM OF UPPER-OUTER QUADRANT OF RIGHT BREAST IN FEMALE, ESTROGEN RECEPTOR POSITIVE: ICD-10-CM

## 2025-02-25 DIAGNOSIS — Z51.0 ENCOUNTER FOR ANTINEOPLASTIC RADIATION THERAPY: ICD-10-CM

## 2025-02-25 LAB
RAD ONC MSQ ACTUAL FRACTIONS DELIVERED: 11
RAD ONC MSQ ACTUAL SESSION DELIVERED DOSE: 267 CGRAY
RAD ONC MSQ ACTUAL TOTAL DOSE: 2937 CGRAY
RAD ONC MSQ ELAPSED DAYS: 14
RAD ONC MSQ LAST DATE: NORMAL
RAD ONC MSQ PRESCRIBED FRACTIONAL DOSE: 267 CGRAY
RAD ONC MSQ PRESCRIBED NUMBER OF FRACTIONS: 15
RAD ONC MSQ PRESCRIBED TECHNIQUE: NORMAL
RAD ONC MSQ PRESCRIBED TOTAL DOSE: 4005 CGRAY
RAD ONC MSQ PRESCRIPTION PATTERN COMMENT: NORMAL
RAD ONC MSQ START DATE: NORMAL
RAD ONC MSQ TREATMENT COURSE NUMBER: 1
RAD ONC MSQ TREATMENT SITE: NORMAL

## 2025-02-25 PROCEDURE — 99215 OFFICE O/P EST HI 40 MIN: CPT | Performed by: INTERNAL MEDICINE

## 2025-02-25 PROCEDURE — 1126F AMNT PAIN NOTED NONE PRSNT: CPT | Performed by: INTERNAL MEDICINE

## 2025-02-25 PROCEDURE — 1159F MED LIST DOCD IN RCRD: CPT | Performed by: INTERNAL MEDICINE

## 2025-02-25 PROCEDURE — 99205 OFFICE O/P NEW HI 60 MIN: CPT | Performed by: INTERNAL MEDICINE

## 2025-02-25 PROCEDURE — 77412 RADIATION TX DELIVERY LVL 3: CPT | Performed by: STUDENT IN AN ORGANIZED HEALTH CARE EDUCATION/TRAINING PROGRAM

## 2025-02-25 PROCEDURE — 77336 RADIATION PHYSICS CONSULT: CPT | Performed by: STUDENT IN AN ORGANIZED HEALTH CARE EDUCATION/TRAINING PROGRAM

## 2025-02-25 PROCEDURE — 77417 THER RADIOLOGY PORT IMAGE(S): CPT | Performed by: STUDENT IN AN ORGANIZED HEALTH CARE EDUCATION/TRAINING PROGRAM

## 2025-02-25 PROCEDURE — 3008F BODY MASS INDEX DOCD: CPT | Performed by: INTERNAL MEDICINE

## 2025-02-25 RX ORDER — ANASTROZOLE 1 MG/1
1 TABLET ORAL DAILY
Qty: 30 TABLET | Refills: 11 | Status: SHIPPED | OUTPATIENT
Start: 2025-02-25 | End: 2026-02-25

## 2025-02-25 SDOH — ECONOMIC STABILITY: FOOD INSECURITY: WITHIN THE PAST 12 MONTHS, YOU WORRIED THAT YOUR FOOD WOULD RUN OUT BEFORE YOU GOT THE MONEY TO BUY MORE.: NEVER TRUE

## 2025-02-25 SDOH — ECONOMIC STABILITY: FOOD INSECURITY: WITHIN THE PAST 12 MONTHS, THE FOOD YOU BOUGHT JUST DIDN'T LAST AND YOU DIDN'T HAVE MONEY TO GET MORE.: NEVER TRUE

## 2025-02-25 ASSESSMENT — PATIENT HEALTH QUESTIONNAIRE - PHQ9
1. LITTLE INTEREST OR PLEASURE IN DOING THINGS: NOT AT ALL
2. FEELING DOWN, DEPRESSED OR HOPELESS: NOT AT ALL
SUM OF ALL RESPONSES TO PHQ9 QUESTIONS 1 AND 2: 0

## 2025-02-25 ASSESSMENT — ENCOUNTER SYMPTOMS
CONSTITUTIONAL NEGATIVE: 1
OCCASIONAL FEELINGS OF UNSTEADINESS: 0
DEPRESSION: 0
GASTROINTESTINAL NEGATIVE: 1
RESPIRATORY NEGATIVE: 1
LOSS OF SENSATION IN FEET: 0
CARDIOVASCULAR NEGATIVE: 1

## 2025-02-25 ASSESSMENT — PAIN SCALES - GENERAL: PAINLEVEL_OUTOF10: 0-NO PAIN

## 2025-02-25 NOTE — PROGRESS NOTES
"Patient ID: Vira Gordon is a 69 y.o. female.  Referring Physician: Richa Felix MD  [address] [address]  Primary Care Provider: Aurora Delgadillo MD  Visit Type:  Initial Visit     Subjective    HPI    T2N0 Oncotype : 10:   69 year old white female who presents for a right breast lumpectomy (partial mastectomy) and SLNB for right breast cancer. She originally presented with a palpable right breast mass. CNB showed IDC.  She had undergone a breast MRI since her last office appointment when she was diagnosed with hormone sensitive invasive ductal carcinoma.  She is here to review MRI results and discuss surgical intervention.  The MRI identified the mass at 3.1 cm with overlying skin dimpling, but no obvious skin involvement of the cancer.  Radiographically, all lymph nodes are negative.   Review of Systems   Constitutional: Negative.    HENT:  Negative.     Respiratory: Negative.     Cardiovascular: Negative.    Gastrointestinal: Negative.         Objective   BSA: 2.15 meters squared  /83 (BP Location: Left arm, Patient Position: Sitting, BP Cuff Size: Large adult)   Pulse 92   Temp 36.3 °C (97.3 °F) (Temporal)   Resp 17   Ht 1.598 m (5' 2.91\")   Wt 104 kg (228 lb 9.9 oz)   SpO2 95%   BMI 40.61 kg/m²      has a past medical history of Hyperlipidemia and Mass of upper outer quadrant of right breast (10/15/2024).   has a past surgical history that includes Hysterectomy; Total knee arthroplasty (Right, 01/2023); XR ankle (Left); and Breast lumpectomy (Right, 12/09/2024).  Family History   Problem Relation Name Age of Onset    Hypertension Mother      Diabetes Mother      Hypertension Father      Lung cancer Sister       Oncology History    No history exists.       Vira Gordon  reports that she has been smoking cigarettes. She has never used smokeless tobacco.  She  reports current alcohol use.  She  reports current drug use. Drug: Marijuana.    Physical " "Exam  Constitutional:       Appearance: Normal appearance.   HENT:      Head: Normocephalic and atraumatic.   Eyes:      Extraocular Movements: Extraocular movements intact.      Pupils: Pupils are equal, round, and reactive to light.   Neurological:      Mental Status: She is alert.         WBC   Date/Time Value Ref Range Status   01/06/2025 12:00 PM 7.1 4.4 - 11.3 x10*3/uL Final     nRBC   Date Value Ref Range Status   01/06/2025 0.0 0.0 - 0.0 /100 WBCs Final     RBC   Date Value Ref Range Status   01/06/2025 4.73 4.00 - 5.20 x10*6/uL Final     Hemoglobin   Date Value Ref Range Status   01/06/2025 14.2 12.0 - 16.0 g/dL Final     Hematocrit   Date Value Ref Range Status   01/06/2025 42.8 36.0 - 46.0 % Final     MCV   Date/Time Value Ref Range Status   01/06/2025 12:00 PM 91 80 - 100 fL Final     MCH   Date/Time Value Ref Range Status   01/06/2025 12:00 PM 30.0 26.0 - 34.0 pg Final     MCHC   Date/Time Value Ref Range Status   01/06/2025 12:00 PM 33.2 32.0 - 36.0 g/dL Final     RDW   Date/Time Value Ref Range Status   01/06/2025 12:00 PM 12.7 11.5 - 14.5 % Final     Platelets   Date/Time Value Ref Range Status   01/06/2025 12:00  150 - 450 x10*3/uL Final     No results found for: \"MPV\"  Neutrophils %   Date/Time Value Ref Range Status   01/06/2025 12:00 PM 63.1 40.0 - 80.0 % Final     Immature Granulocytes %, Automated   Date/Time Value Ref Range Status   01/06/2025 12:00 PM 0.1 0.0 - 0.9 % Final     Comment:     Immature Granulocyte Count (IG) includes promyelocytes, myelocytes and metamyelocytes but does not include bands. Percent differential counts (%) should be interpreted in the context of the absolute cell counts (cells/UL).     Lymphocytes %   Date/Time Value Ref Range Status   01/06/2025 12:00 PM 26.3 13.0 - 44.0 % Final     Monocytes %   Date/Time Value Ref Range Status   01/06/2025 12:00 PM 6.3 2.0 - 10.0 % Final     Eosinophils %   Date/Time Value Ref Range Status   01/06/2025 12:00 PM 3.5 0.0 - " 6.0 % Final     Basophils %   Date/Time Value Ref Range Status   01/06/2025 12:00 PM 0.7 0.0 - 2.0 % Final     Neutrophils Absolute   Date/Time Value Ref Range Status   01/06/2025 12:00 PM 4.47 1.20 - 7.70 x10*3/uL Final     Comment:     Percent differential counts (%) should be interpreted in the context of the absolute cell counts (cells/uL).     Immature Granulocytes Absolute, Automated   Date/Time Value Ref Range Status   01/06/2025 12:00 PM 0.01 0.00 - 0.70 x10*3/uL Final     Lymphocytes Absolute   Date/Time Value Ref Range Status   01/06/2025 12:00 PM 1.87 1.20 - 4.80 x10*3/uL Final     Monocytes Absolute   Date/Time Value Ref Range Status   01/06/2025 12:00 PM 0.45 0.10 - 1.00 x10*3/uL Final     Eosinophils Absolute   Date/Time Value Ref Range Status   01/06/2025 12:00 PM 0.25 0.00 - 0.70 x10*3/uL Final     Basophils Absolute   Date/Time Value Ref Range Status   01/06/2025 12:00 PM 0.05 0.00 - 0.10 x10*3/uL Final   Risk factors for breast cancer: 69-year-old white female; menarche at age 14; first live birth at age 20; no previous breast biopsy; no family history of breast cancer. No family history of ovarian/pancreatic/prostate cancer. She has a sister who had tobacco related lung cancer. She went through menopause around her mid 50s, never took any hormone replacement therapy. This gives her a 5-year Antonia score of 1.2% and a lifetime risk of 4.2% which puts her in a less than average risk category.   Assessment/Plan      Early Stage BC: Oncoltype prediction low risk No /less than 1% on CTX:   NCCN recommemnds HB: Arimidex prescribed : 5 years:  Commence after XRT.    IMPRESSION:  DEXA:  According to World Health Organization criteria,  classification is osteoporosis. On Alendronate daily. RTC 3 months. Dr Thurman to continue ordering her MMG.     Diagnoses and all orders for this visit:  Malignant neoplasm of upper-outer quadrant of right breast in female, estrogen receptor positive  -     Referral To  Hematology and Oncology  -     anastrozole (Arimidex) 1 mg tablet; Take 1 tablet (1 mg total) by mouth once daily.  Swallow whole with a drink of water.  -     Clinic Appointment Request Follow Up (assess anastrozole tolernace); Future           Chavez-Tadeo Gregory MD

## 2025-02-26 ENCOUNTER — HOSPITAL ENCOUNTER (OUTPATIENT)
Dept: RADIATION ONCOLOGY | Facility: CLINIC | Age: 70
Setting detail: RADIATION/ONCOLOGY SERIES
Discharge: HOME | End: 2025-02-26
Payer: COMMERCIAL

## 2025-02-26 DIAGNOSIS — Z51.0 ENCOUNTER FOR ANTINEOPLASTIC RADIATION THERAPY: ICD-10-CM

## 2025-02-26 DIAGNOSIS — C50.811 MALIGNANT NEOPLASM OF OVERLAPPING SITES OF RIGHT FEMALE BREAST: ICD-10-CM

## 2025-02-26 LAB
RAD ONC MSQ ACTUAL FRACTIONS DELIVERED: 12
RAD ONC MSQ ACTUAL SESSION DELIVERED DOSE: 267 CGRAY
RAD ONC MSQ ACTUAL TOTAL DOSE: 3204 CGRAY
RAD ONC MSQ ELAPSED DAYS: 15
RAD ONC MSQ LAST DATE: NORMAL
RAD ONC MSQ PRESCRIBED FRACTIONAL DOSE: 267 CGRAY
RAD ONC MSQ PRESCRIBED NUMBER OF FRACTIONS: 15
RAD ONC MSQ PRESCRIBED TECHNIQUE: NORMAL
RAD ONC MSQ PRESCRIBED TOTAL DOSE: 4005 CGRAY
RAD ONC MSQ PRESCRIPTION PATTERN COMMENT: NORMAL
RAD ONC MSQ START DATE: NORMAL
RAD ONC MSQ TREATMENT COURSE NUMBER: 1
RAD ONC MSQ TREATMENT SITE: NORMAL

## 2025-02-26 PROCEDURE — 77412 RADIATION TX DELIVERY LVL 3: CPT | Performed by: STUDENT IN AN ORGANIZED HEALTH CARE EDUCATION/TRAINING PROGRAM

## 2025-02-27 ENCOUNTER — HOSPITAL ENCOUNTER (OUTPATIENT)
Dept: RADIATION ONCOLOGY | Facility: CLINIC | Age: 70
Setting detail: RADIATION/ONCOLOGY SERIES
Discharge: HOME | End: 2025-02-27
Payer: COMMERCIAL

## 2025-02-27 VITALS
SYSTOLIC BLOOD PRESSURE: 156 MMHG | RESPIRATION RATE: 16 BRPM | TEMPERATURE: 97 F | DIASTOLIC BLOOD PRESSURE: 93 MMHG | HEART RATE: 74 BPM | BODY MASS INDEX: 41.02 KG/M2 | WEIGHT: 230.9 LBS | OXYGEN SATURATION: 97 %

## 2025-02-27 DIAGNOSIS — C50.811 MALIGNANT NEOPLASM OF OVERLAPPING SITES OF RIGHT FEMALE BREAST: ICD-10-CM

## 2025-02-27 DIAGNOSIS — Z51.0 ENCOUNTER FOR ANTINEOPLASTIC RADIATION THERAPY: ICD-10-CM

## 2025-02-27 DIAGNOSIS — C50.411 MALIGNANT NEOPLASM OF UPPER-OUTER QUADRANT OF RIGHT BREAST IN FEMALE, ESTROGEN RECEPTOR POSITIVE: Primary | ICD-10-CM

## 2025-02-27 DIAGNOSIS — Z17.0 MALIGNANT NEOPLASM OF UPPER-OUTER QUADRANT OF RIGHT BREAST IN FEMALE, ESTROGEN RECEPTOR POSITIVE: Primary | ICD-10-CM

## 2025-02-27 LAB
RAD ONC MSQ ACTUAL FRACTIONS DELIVERED: 13
RAD ONC MSQ ACTUAL SESSION DELIVERED DOSE: 267 CGRAY
RAD ONC MSQ ACTUAL TOTAL DOSE: 3471 CGRAY
RAD ONC MSQ ELAPSED DAYS: 16
RAD ONC MSQ LAST DATE: NORMAL
RAD ONC MSQ PRESCRIBED FRACTIONAL DOSE: 267 CGRAY
RAD ONC MSQ PRESCRIBED NUMBER OF FRACTIONS: 15
RAD ONC MSQ PRESCRIBED TECHNIQUE: NORMAL
RAD ONC MSQ PRESCRIBED TOTAL DOSE: 4005 CGRAY
RAD ONC MSQ PRESCRIPTION PATTERN COMMENT: NORMAL
RAD ONC MSQ START DATE: NORMAL
RAD ONC MSQ TREATMENT COURSE NUMBER: 1
RAD ONC MSQ TREATMENT SITE: NORMAL

## 2025-02-27 PROCEDURE — 77412 RADIATION TX DELIVERY LVL 3: CPT | Performed by: STUDENT IN AN ORGANIZED HEALTH CARE EDUCATION/TRAINING PROGRAM

## 2025-02-27 ASSESSMENT — ENCOUNTER SYMPTOMS
OCCASIONAL FEELINGS OF UNSTEADINESS: 0
LOSS OF SENSATION IN FEET: 0
DEPRESSION: 0

## 2025-02-27 ASSESSMENT — PATIENT HEALTH QUESTIONNAIRE - PHQ9
1. LITTLE INTEREST OR PLEASURE IN DOING THINGS: NOT AT ALL
SUM OF ALL RESPONSES TO PHQ9 QUESTIONS 1 & 2: 0
2. FEELING DOWN, DEPRESSED OR HOPELESS: NOT AT ALL

## 2025-02-27 ASSESSMENT — PAIN SCALES - GENERAL: PAINLEVEL_OUTOF10: 0-NO PAIN

## 2025-02-27 NOTE — PROGRESS NOTES
Radiation Oncology On Treatment Visit    Patient Name:  Vira Gordon  MRN:  [id number]  :  1955    Referring Provider: Benjamín Verdin MD  Primary Care Provider: Aurora Delgadillo MD  Care Team: Patient Care Team:  Aurora Delgadillo MD as PCP - General (Family Medicine)  Samina Hernandez RN as Registered Nurse (Radiology)  Jelani Gregory MD as Referring Physician (Hematology and Oncology)    Date of Service: 2025     Diagnosis:   Specialty Problems          Radiation Oncology Problems    Malignant neoplasm of upper-outer quadrant of right breast in female, estrogen receptor positive         Treatment Summary:  3D CRT: Right Breast    Treatment Period Technique Fraction Dose Fractions Total Dose   Course 1 2025-2025  (days elapsed: 16)         Rt Breast 2025-2025 3D 267 / 267 cGy 13 / 15 3471 / 4,005 cGy     SUBJECTIVE: Patient is having more redness over the right breast with some swelling, but she denies any skin peeling or tender areas. She is feeling fatigued and is napping more.      OBJECTIVE:   Vital Signs:  BP (!) 156/93   Pulse 74   Temp 36.1 °C (97 °F) (Temporal)   Resp 16   Wt 105 kg (230 lb 14.4 oz)   SpO2 97%   BMI 41.02 kg/m²     Other Pertinent Findings:   Moderate erythema over right breast without desquamation.     Toxicity Assessment          2025    13:25 2025    13:24 2025    15:53   Toxicity Assessment   Adverse Events Reviewed (WDL) No (Exceptions to WDL) No (Exceptions to WDL) No (Exceptions to WDL)   Treatment Site Breast Breast Breast   Anorexia Grade 0 Grade 0 Grade 0   Anxiety Grade 0 Grade 0 Grade 0   Dehydration Grade 0 Grade 0 Grade 0   Depression Grade 0 Grade 0 Grade 0   Dermatitis Radiation Grade 0       skintegrity given Grade 0       states skin doing well, slightly itchy. she will try some otc hydrocortisone cream Grade 0       states skin doing well, no open areas, blistering, peeling, etc.   Diarrhea Grade 0 Grade 0  Grade 0   Fatigue Grade 0 Grade 1       starting to feel a little tired Grade 1       feeling much more tired, still working, rests prn   Fibrosis Deep Connective Tissue Grade 0 Grade 0 Grade 0   Fracture Grade 0 Grade 0 Grade 0   Nausea Grade 0 Grade 0 Grade 0   Pain Grade 0       occasional twinges from surgery Grade 0 Grade 0   Treatment Related Secondary Malignancy Grade 0 Grade 0 Grade 0   Tumor Pain Grade 0 Grade 0 Grade 0   Vomiting Grade 0 Grade 0 Grade 0   Dysphagia Grade 0     Esophagitis Grade 0     Lymphedema Grade 0 Grade 0 Grade 0        Assessment / Plan:  The patient is tolerating radiation therapy as anticipated.  Continue per current treatment plan.       She will complete radiation next Monday and I will see her for follow up in about one month.

## 2025-02-28 ENCOUNTER — HOSPITAL ENCOUNTER (OUTPATIENT)
Dept: RADIATION ONCOLOGY | Facility: CLINIC | Age: 70
Setting detail: RADIATION/ONCOLOGY SERIES
Discharge: HOME | End: 2025-02-28
Payer: COMMERCIAL

## 2025-02-28 DIAGNOSIS — Z51.0 ENCOUNTER FOR ANTINEOPLASTIC RADIATION THERAPY: ICD-10-CM

## 2025-02-28 DIAGNOSIS — C50.811 MALIGNANT NEOPLASM OF OVERLAPPING SITES OF RIGHT FEMALE BREAST: ICD-10-CM

## 2025-02-28 LAB
RAD ONC MSQ ACTUAL FRACTIONS DELIVERED: 14
RAD ONC MSQ ACTUAL SESSION DELIVERED DOSE: 267 CGRAY
RAD ONC MSQ ACTUAL TOTAL DOSE: 3738 CGRAY
RAD ONC MSQ ELAPSED DAYS: 17
RAD ONC MSQ LAST DATE: NORMAL
RAD ONC MSQ PRESCRIBED FRACTIONAL DOSE: 267 CGRAY
RAD ONC MSQ PRESCRIBED NUMBER OF FRACTIONS: 15
RAD ONC MSQ PRESCRIBED TECHNIQUE: NORMAL
RAD ONC MSQ PRESCRIBED TOTAL DOSE: 4005 CGRAY
RAD ONC MSQ PRESCRIPTION PATTERN COMMENT: NORMAL
RAD ONC MSQ START DATE: NORMAL
RAD ONC MSQ TREATMENT COURSE NUMBER: 1
RAD ONC MSQ TREATMENT SITE: NORMAL

## 2025-02-28 PROCEDURE — 77412 RADIATION TX DELIVERY LVL 3: CPT | Performed by: STUDENT IN AN ORGANIZED HEALTH CARE EDUCATION/TRAINING PROGRAM

## 2025-03-03 ENCOUNTER — HOSPITAL ENCOUNTER (OUTPATIENT)
Dept: RADIATION ONCOLOGY | Facility: CLINIC | Age: 70
Setting detail: RADIATION/ONCOLOGY SERIES
Discharge: HOME | End: 2025-03-03
Payer: COMMERCIAL

## 2025-03-03 ENCOUNTER — DOCUMENTATION (OUTPATIENT)
Dept: RADIATION ONCOLOGY | Facility: CLINIC | Age: 70
End: 2025-03-03
Payer: COMMERCIAL

## 2025-03-03 DIAGNOSIS — Z51.0 ENCOUNTER FOR ANTINEOPLASTIC RADIATION THERAPY: ICD-10-CM

## 2025-03-03 DIAGNOSIS — C50.811 MALIGNANT NEOPLASM OF OVERLAPPING SITES OF RIGHT FEMALE BREAST: ICD-10-CM

## 2025-03-03 LAB
RAD ONC MSQ ACTUAL FRACTIONS DELIVERED: 15
RAD ONC MSQ ACTUAL SESSION DELIVERED DOSE: 267 CGRAY
RAD ONC MSQ ACTUAL TOTAL DOSE: 4005 CGRAY
RAD ONC MSQ ELAPSED DAYS: 20
RAD ONC MSQ LAST DATE: NORMAL
RAD ONC MSQ PRESCRIBED FRACTIONAL DOSE: 267 CGRAY
RAD ONC MSQ PRESCRIBED NUMBER OF FRACTIONS: 15
RAD ONC MSQ PRESCRIBED TECHNIQUE: NORMAL
RAD ONC MSQ PRESCRIBED TOTAL DOSE: 4005 CGRAY
RAD ONC MSQ PRESCRIPTION PATTERN COMMENT: NORMAL
RAD ONC MSQ START DATE: NORMAL
RAD ONC MSQ TREATMENT COURSE NUMBER: 1
RAD ONC MSQ TREATMENT SITE: NORMAL

## 2025-03-03 PROCEDURE — 77412 RADIATION TX DELIVERY LVL 3: CPT | Performed by: STUDENT IN AN ORGANIZED HEALTH CARE EDUCATION/TRAINING PROGRAM

## 2025-03-06 ENCOUNTER — APPOINTMENT (OUTPATIENT)
Dept: RADIATION ONCOLOGY | Facility: CLINIC | Age: 70
End: 2025-03-06

## 2025-03-10 NOTE — PROGRESS NOTES
Radiation Oncology Treatment Summary    Patient Name:  Vira Gordon  MRN:  [id number]  :  1955    Referring Provider: No ref. provider found  Primary Care Provider: Aurora Delgadillo MD    Brief History: Vira Gordon is a 69 y.o. female with  stage IB pT2 pN0 cM0 right breast invasive ductal carcinoma (grade 2, ER+/[place]+/HER2-) s/p right lumpectomy/SLNB 24. The patient completed radiotherapy as outlined below.    Radiation Treatment Summary:    3D CRT: Right Breast    Treatment Period Technique Fraction Dose Fractions Total Dose   Course 1 2025-3/3/2025  (days elapsed: 20)         Rt Breast 2025-3/3/2025 3D 267 / 267 cGy 15 / 15 4005 / 4,005 cGy       Please see the patient's [facility] chart for further details regarding the radiation plan, including beam energy.    Concurrent Chemotherapy:  Treatment Plans       No treatment plans exist          CTCAE Toxicity Overview:   Toxicity Assessment          2025    13:25 2025    13:24 2025    15:53   Toxicity Assessment   Adverse Events Reviewed (WDL) No (Exceptions to WDL) No (Exceptions to WDL) No (Exceptions to WDL)   Treatment Site Breast Breast Breast   Anorexia Grade 0 Grade 0 Grade 0   Anxiety Grade 0 Grade 0 Grade 0   Dehydration Grade 0 Grade 0 Grade 0   Depression Grade 0 Grade 0 Grade 0   Dermatitis Radiation Grade 0       skintegrity given Grade 0       states skin doing well, slightly itchy. she will try some otc hydrocortisone cream Grade 0       states skin doing well, no open areas, blistering, peeling, etc.   Diarrhea Grade 0 Grade 0 Grade 0   Fatigue Grade 0 Grade 1       starting to feel a little tired Grade 1       feeling much more tired, still working, rests prn   Fibrosis Deep Connective Tissue Grade 0 Grade 0 Grade 0   Fracture Grade 0 Grade 0 Grade 0   Nausea Grade 0 Grade 0 Grade 0   Pain Grade 0       occasional twinges from surgery Grade 0 Grade 0   Treatment Related Secondary Malignancy Grade 0 Grade 0 Grade  0   Tumor Pain Grade 0 Grade 0 Grade 0   Vomiting Grade 0 Grade 0 Grade 0   Dysphagia Grade 0     Esophagitis Grade 0     Lymphedema Grade 0 Grade 0 Grade 0     Patient Disposition: Patient completed treatment with anticipated side effects, including: fatigue, and mild erythema to treatment area (used skintegrity). She will be see for follow up on 4/2/25. She knows to call the office with any questions or concerns.

## 2025-04-02 ENCOUNTER — HOSPITAL ENCOUNTER (OUTPATIENT)
Dept: RADIATION ONCOLOGY | Facility: CLINIC | Age: 70
Setting detail: RADIATION/ONCOLOGY SERIES
Discharge: HOME | End: 2025-04-02

## 2025-04-02 VITALS
TEMPERATURE: 97.3 F | HEART RATE: 85 BPM | SYSTOLIC BLOOD PRESSURE: 155 MMHG | WEIGHT: 230.7 LBS | BODY MASS INDEX: 40.98 KG/M2 | OXYGEN SATURATION: 96 % | DIASTOLIC BLOOD PRESSURE: 97 MMHG | RESPIRATION RATE: 20 BRPM

## 2025-04-02 DIAGNOSIS — Z17.0 MALIGNANT NEOPLASM OF UPPER-OUTER QUADRANT OF RIGHT BREAST IN FEMALE, ESTROGEN RECEPTOR POSITIVE: Primary | ICD-10-CM

## 2025-04-02 DIAGNOSIS — C50.411 MALIGNANT NEOPLASM OF UPPER-OUTER QUADRANT OF RIGHT BREAST IN FEMALE, ESTROGEN RECEPTOR POSITIVE: Primary | ICD-10-CM

## 2025-04-02 PROCEDURE — 99214 OFFICE O/P EST MOD 30 MIN: CPT | Performed by: STUDENT IN AN ORGANIZED HEALTH CARE EDUCATION/TRAINING PROGRAM

## 2025-04-02 ASSESSMENT — ENCOUNTER SYMPTOMS
DEPRESSION: 0
LOSS OF SENSATION IN FEET: 0
OCCASIONAL FEELINGS OF UNSTEADINESS: 0

## 2025-04-02 ASSESSMENT — PAIN SCALES - GENERAL: PAINLEVEL_OUTOF10: 2

## 2025-04-02 NOTE — PROGRESS NOTES
Radiation Oncology Nursing Note    Pain: The patient's current pain level was assessed.  They report currently having a pain of 2 out of 10.  They feel their pain is under control with the use of pain medications.Patient has baseline arthritis pain.    Review of Systems:  Review of Systems - Oncology

## 2025-04-02 NOTE — PROGRESS NOTES
Radiation Oncology Follow-Up    Patient Name:  Vira Gordon  MRN:  [id number]  :  1955    Referring Provider: Richa Felix MD   Primary Care Provider: Aurora Delgadillo MD  Care Team: Patient Care Team:  Aurora Delgadillo MD as PCP - General (Family Medicine)  Samina Hernandez RN as Registered Nurse (Radiology)  Jelani Gregory MD as Referring Physician (Hematology and Oncology)    Date of Service: 2025    SUBJECTIVE  History of Present Illness:  Vira Gordon is a 69 y.o. female who was previously seen at the [facility] Department of Radiation Oncology for stage IA pT2 pN0 cM0 right breast invasive ductal carcinoma (grade 2, ER+/[place]+/HER2-, Oncotype DX 10) s/p right lumpectomy/SLNB on 24.    She received postop right breast radiation 40.05 Gy/15 fx completed 3/3/25. She is on anastrozole starting a week after finishing radiation.    Today, she feels well overall. Her skin dermatitis has resolved, with some darkening of the skin and some induration around the lumpectomy incision. She has some occasional pains in the right breast that have been stable. Her hot flashes have not changed with anastrozole, and she denies new back/bone pains. She is having some sinus headaches that are not unusual for her, and she denies nausea/vomiting, or vision changes. She continues to work as usual.    Treatment Rendered:   3D CRT: Right Breast    Treatment Period Technique Fraction Dose Fractions Total Dose   Course 1 2025-3/3/2025  (days elapsed: 20)         Rt Breast 2025-3/3/2025 3D 267 / 267 cGy 15 / 15 4005 / 4,005 cGy       Review of Systems:   Review of Systems   All other systems reviewed and are negative.      Performance Status:   The Karnofsky performance scale today is 90, Able to carry on normal activity; minor signs or symptoms of disease (ECOG equivalent 0).       OBJECTIVE  Vital Signs:  BP (!) 155/97   Pulse 85   Temp 36.3 °C (97.3 °F)   Resp 20    Wt 105 kg (230 lb 11.2 oz)   SpO2 96%   BMI 40.98 kg/m²   Physical Exam  Constitutional:       General: She is not in acute distress.     Appearance: Normal appearance. She is not toxic-appearing.   HENT:      Head: Normocephalic and atraumatic.      Mouth/Throat:      Mouth: Mucous membranes are moist.      Pharynx: Oropharynx is clear. No oropharyngeal exudate or posterior oropharyngeal erythema.   Eyes:      General: No scleral icterus.     Extraocular Movements: Extraocular movements intact.      Conjunctiva/sclera: Conjunctivae normal.      Pupils: Pupils are equal, round, and reactive to light.   Pulmonary:      Effort: Pulmonary effort is normal. No respiratory distress.   Chest:   Breasts:     Right: Swelling and skin change present. No bleeding, inverted nipple, mass, nipple discharge or tenderness.      Left: Normal.      Comments: Right superior lumpectomy incision intact and healed, with underlying and surrounding mild induration/edema. Some hyperpigmentation over the right breast without desquamation. No suspicious breast masses, skin changes, or nipple discharge.  Musculoskeletal:         General: Normal range of motion.      Cervical back: Normal range of motion and neck supple.      Right lower leg: No edema.      Left lower leg: No edema.   Lymphadenopathy:      Cervical: No cervical adenopathy.      Upper Body:      Right upper body: No supraclavicular, axillary or pectoral adenopathy.      Left upper body: No supraclavicular, axillary or pectoral adenopathy.   Skin:     General: Skin is warm and dry.      Coloration: Skin is not jaundiced.      Findings: No lesion or rash.   Neurological:      General: No focal deficit present.      Mental Status: She is alert and oriented to person, place, and time.      Cranial Nerves: No cranial nerve deficit.      Sensory: No sensory deficit.      Motor: No weakness.      Coordination: Coordination normal.      Gait: Gait normal.   Psychiatric:          Mood and Affect: Mood normal.         Behavior: Behavior normal.            ASSESSMENT:   Vira Gordon is a 69 y.o. female with stage IA pT2 pN0 cM0 right breast invasive ductal carcinoma (grade 2, ER+/CT+/HER2-, Oncotype DX 10) s/p right lumpectomy/SLNB on 12/9/24.    She received postop right breast radiation 40.05 Gy/15 fx completed 3/3/25. She is on anastrozole starting a week after finishing radiation. Her acute radiation side effects have resolved and she is tolerating anastrozole well so far.    She will follow up with medical oncology 5/5/25 and she will have mammograms 6/24/25 and follow up with Dr. Felix 7/2/25. I will see her for follow up in about 6 months.    NCCN Guidelines were applicable to guide this patients treatment plan.    Benjamín Verdin MD

## 2025-04-18 ENCOUNTER — HOSPITAL ENCOUNTER (EMERGENCY)
Facility: HOSPITAL | Age: 70
Discharge: HOME | End: 2025-04-18
Payer: COMMERCIAL

## 2025-04-18 VITALS
HEART RATE: 95 BPM | WEIGHT: 230 LBS | HEIGHT: 62 IN | DIASTOLIC BLOOD PRESSURE: 94 MMHG | SYSTOLIC BLOOD PRESSURE: 163 MMHG | OXYGEN SATURATION: 95 % | TEMPERATURE: 97.5 F | BODY MASS INDEX: 42.33 KG/M2

## 2025-04-18 DIAGNOSIS — L03.211 CELLULITIS OF FACE: Primary | ICD-10-CM

## 2025-04-18 LAB
ALBUMIN SERPL BCP-MCNC: 4.4 G/DL (ref 3.4–5)
ALP SERPL-CCNC: 72 U/L (ref 33–136)
ALT SERPL W P-5'-P-CCNC: 12 U/L (ref 7–45)
ANION GAP SERPL CALC-SCNC: 20 MMOL/L (ref 10–20)
AST SERPL W P-5'-P-CCNC: 12 U/L (ref 9–39)
BASOPHILS # BLD AUTO: 0.09 X10*3/UL (ref 0–0.1)
BASOPHILS NFR BLD AUTO: 1.3 %
BILIRUB SERPL-MCNC: 0.5 MG/DL (ref 0–1.2)
BUN SERPL-MCNC: 13 MG/DL (ref 6–23)
CALCIUM SERPL-MCNC: 10 MG/DL (ref 8.6–10.3)
CHLORIDE SERPL-SCNC: 99 MMOL/L (ref 98–107)
CO2 SERPL-SCNC: 27 MMOL/L (ref 21–32)
CREAT SERPL-MCNC: 0.89 MG/DL (ref 0.5–1.05)
EGFRCR SERPLBLD CKD-EPI 2021: 71 ML/MIN/1.73M*2
EOSINOPHIL # BLD AUTO: 0.38 X10*3/UL (ref 0–0.7)
EOSINOPHIL NFR BLD AUTO: 5.4 %
ERYTHROCYTE [DISTWIDTH] IN BLOOD BY AUTOMATED COUNT: 12.8 % (ref 11.5–14.5)
GLUCOSE SERPL-MCNC: 100 MG/DL (ref 74–99)
HCT VFR BLD AUTO: 41.8 % (ref 36–46)
HGB BLD-MCNC: 13.4 G/DL (ref 12–16)
IMM GRANULOCYTES # BLD AUTO: 0.02 X10*3/UL (ref 0–0.7)
IMM GRANULOCYTES NFR BLD AUTO: 0.3 % (ref 0–0.9)
LYMPHOCYTES # BLD AUTO: 1.96 X10*3/UL (ref 1.2–4.8)
LYMPHOCYTES NFR BLD AUTO: 28.1 %
MCH RBC QN AUTO: 30 PG (ref 26–34)
MCHC RBC AUTO-ENTMCNC: 32.1 G/DL (ref 32–36)
MCV RBC AUTO: 94 FL (ref 80–100)
MONOCYTES # BLD AUTO: 0.54 X10*3/UL (ref 0.1–1)
MONOCYTES NFR BLD AUTO: 7.7 %
NEUTROPHILS # BLD AUTO: 3.99 X10*3/UL (ref 1.2–7.7)
NEUTROPHILS NFR BLD AUTO: 57.2 %
NRBC BLD-RTO: 0 /100 WBCS (ref 0–0)
PLATELET # BLD AUTO: 350 X10*3/UL (ref 150–450)
POTASSIUM SERPL-SCNC: 4.6 MMOL/L (ref 3.5–5.3)
PROT SERPL-MCNC: 7.8 G/DL (ref 6.4–8.2)
RBC # BLD AUTO: 4.46 X10*6/UL (ref 4–5.2)
SODIUM SERPL-SCNC: 141 MMOL/L (ref 136–145)
WBC # BLD AUTO: 7 X10*3/UL (ref 4.4–11.3)

## 2025-04-18 PROCEDURE — 99284 EMERGENCY DEPT VISIT MOD MDM: CPT

## 2025-04-18 PROCEDURE — 2500000004 HC RX 250 GENERAL PHARMACY W/ HCPCS (ALT 636 FOR OP/ED): Performed by: PHYSICIAN ASSISTANT

## 2025-04-18 PROCEDURE — 36415 COLL VENOUS BLD VENIPUNCTURE: CPT | Performed by: PHYSICIAN ASSISTANT

## 2025-04-18 PROCEDURE — 85025 COMPLETE CBC W/AUTO DIFF WBC: CPT | Performed by: PHYSICIAN ASSISTANT

## 2025-04-18 PROCEDURE — 96365 THER/PROPH/DIAG IV INF INIT: CPT

## 2025-04-18 PROCEDURE — 84075 ASSAY ALKALINE PHOSPHATASE: CPT | Performed by: PHYSICIAN ASSISTANT

## 2025-04-18 RX ORDER — CLINDAMYCIN PHOSPHATE 600 MG/50ML
600 INJECTION, SOLUTION INTRAVENOUS ONCE
Status: COMPLETED | OUTPATIENT
Start: 2025-04-18 | End: 2025-04-18

## 2025-04-18 RX ORDER — CLINDAMYCIN HYDROCHLORIDE 300 MG/1
300 CAPSULE ORAL 3 TIMES DAILY
Qty: 30 CAPSULE | Refills: 0 | Status: SHIPPED | OUTPATIENT
Start: 2025-04-18 | End: 2025-04-18

## 2025-04-18 RX ORDER — CLINDAMYCIN HYDROCHLORIDE 300 MG/1
300 CAPSULE ORAL 3 TIMES DAILY
Qty: 30 CAPSULE | Refills: 0 | Status: SHIPPED | OUTPATIENT
Start: 2025-04-18 | End: 2025-04-28

## 2025-04-18 RX ADMIN — CLINDAMYCIN PHOSPHATE 600 MG: 600 INJECTION, SOLUTION INTRAVENOUS at 17:09

## 2025-04-18 ASSESSMENT — PAIN DESCRIPTION - LOCATION: LOCATION: FACE

## 2025-04-18 ASSESSMENT — PAIN SCALES - GENERAL: PAINLEVEL_OUTOF10: 4

## 2025-04-18 ASSESSMENT — LIFESTYLE VARIABLES
HAVE YOU EVER FELT YOU SHOULD CUT DOWN ON YOUR DRINKING: NO
EVER FELT BAD OR GUILTY ABOUT YOUR DRINKING: NO
TOTAL SCORE: 0
EVER HAD A DRINK FIRST THING IN THE MORNING TO STEADY YOUR NERVES TO GET RID OF A HANGOVER: NO
HAVE PEOPLE ANNOYED YOU BY CRITICIZING YOUR DRINKING: NO

## 2025-04-18 ASSESSMENT — PAIN DESCRIPTION - ORIENTATION: ORIENTATION: LEFT

## 2025-04-18 ASSESSMENT — PAIN - FUNCTIONAL ASSESSMENT: PAIN_FUNCTIONAL_ASSESSMENT: 0-10

## 2025-04-18 ASSESSMENT — PAIN DESCRIPTION - PAIN TYPE: TYPE: ACUTE PAIN

## 2025-04-18 NOTE — ED PROVIDER NOTES
EMERGENCY MEDICINE EVALUATION NOTE    History of Present Illness     Chief Complaint:   Chief Complaint   Patient presents with    Insect Bite       HPI: Vira Gordon is a 69 y.o. female presents with a chief complaint of possible insect bite.  Patient reports that symptoms started about 3 to 4 days ago.  She states that she was outside and noted that a horse fly flew past her and potentially bit her face and she smacked it away.  She states that since then she started to notice some swelling around the area.  She reports that there is some erythema and maybe a little area of drainage from the area.  She states that she went to see her primary care provider today for antibiotics and they were concerned there potentially was a brown recluse bite.  She states that she was never around any spiders and she does remember the horse bite biting her.  She said they were also concerned of cellulitis potentially spreading up into the large amounts of bright area.  Patient denies any pain with eye movement and denies any change in her vision.    Previous History   Medical History[1]  Surgical History[2]  Social History[3]  Family History[4]  Allergies[5]  Current Outpatient Medications   Medication Instructions    anastrozole (ARIMIDEX) 1 mg, oral, Daily, Swallow whole with a drink of water.    atorvastatin (Lipitor) 20 mg tablet 1 tablet, Daily    buPROPion XL (Wellbutrin XL) 150 mg 24 hr tablet 1 tablet, Every morning    celecoxib (CELEBREX) 100 mg, 2 times daily    clindamycin (CLEOCIN) 300 mg, oral, 3 times daily    meloxicam (MOBIC) 7.5 mg       Physical Exam     Appearance: Alert, oriented , cooperative     Skin: Intact,  dry skin, no petechiae or purpura.  Erythema and swelling over the left cheek area.  There is no obvious drainage.  There is appear to be an area where the wound was scabbed over from the patient rubbing the area.  No necrotic center.     Eyes: PERRLA, EOMs intact,  Conjunctiva pink with no redness or  "exudates.  Patient can move her left eye through full range of motion without any pain.     ENT: Hearing grossly intact. Pharynx clear, uvula midline.      Neck: Supple. Trachea at midline.      Pulmonary: Clear bilaterally. No rales, rhonchi or wheezing. No accessory muscle use or stridor.     Cardiac: Normal rate and rhythm without murmur     Abdomen: Soft, nontender, active bowel sounds.     Musculoskeletal: Full range of motion.      Neurological:Cranial nerves II through XII are grossly intact, normal sensation, no weakness, no focal findings identified.     Results     Labs Reviewed   COMPREHENSIVE METABOLIC PANEL - Abnormal       Result Value    Glucose 100 (*)     Sodium 141      Potassium 4.6      Chloride 99      Bicarbonate 27      Anion Gap 20      Urea Nitrogen 13      Creatinine 0.89      eGFR 71      Calcium 10.0      Albumin 4.4      Alkaline Phosphatase 72      Total Protein 7.8      AST 12      Bilirubin, Total 0.5      ALT 12     CBC WITH AUTO DIFFERENTIAL    WBC 7.0      nRBC 0.0      RBC 4.46      Hemoglobin 13.4      Hematocrit 41.8      MCV 94      MCH 30.0      MCHC 32.1      RDW 12.8      Platelets 350      Neutrophils % 57.2      Immature Granulocytes %, Automated 0.3      Lymphocytes % 28.1      Monocytes % 7.7      Eosinophils % 5.4      Basophils % 1.3      Neutrophils Absolute 3.99      Immature Granulocytes Absolute, Automated 0.02      Lymphocytes Absolute 1.96      Monocytes Absolute 0.54      Eosinophils Absolute 0.38      Basophils Absolute 0.09       No orders to display         ED Course & Medical Decision Making     Medications   clindamycin (Cleocin) 600 mg in dextrose 5% IV 50 mL (0 mg intravenous Stopped 4/18/25 1828)     Heart Rate:  [95]   Temperature:  [36.4 °C (97.5 °F)]   BP: (163)/(94)   Height:  [157.5 cm (5' 2\")]   Weight:  [104 kg (230 lb)]   Pulse Ox:  [95 %]    ED Course as of 04/18/25 1949 Fri Apr 18, 2025 1803 Updated patient on results.  Patient's blood work " did not show any acute abnormalities.  Patient had normal CBC and normal CMP.  The area does have a little area that potentially be draining but there is no significant area of necrotic tissue.  I do not believe that there is potentially a recluse bite.  Patient also remembers being bit by a horse fly in the same area when the swelling started.  Patient did receive a dose of IV clindamycin here so we discharged home with oral clindamycin with strict return precautions return to the emergency department with any worsening symptoms.  The area does not appear to be going into the orbit space and there is no pain with movement of the left eye. [CJ]      ED Course User Index  [CJ] Roverto Holguin PA-C         Diagnoses as of 04/18/25 1949   Cellulitis of face       Procedures   Procedures    Diagnosis     1. Cellulitis of face        Disposition   Discharged    ED Prescriptions       Medication Sig Dispense Start Date End Date Auth. Provider    clindamycin (Cleocin) 300 mg capsule  (Status: Discontinued) Take 1 capsule (300 mg) by mouth 3 times a day for 10 days. 30 capsule 4/18/2025 4/18/2025 Roverto Holguin PA-C    clindamycin (Cleocin) 300 mg capsule Take 1 capsule (300 mg) by mouth 3 times a day for 10 days. 30 capsule 4/18/2025 4/28/2025 Roverto Holguin PA-C            Disclaimer: This note was dictated by speech recognition. Minor errors in transcription may be present. Please call if questions.         [1]   Past Medical History:  Diagnosis Date    Hyperlipidemia     Mass of upper outer quadrant of right breast 10/15/2024    12 o'clock; 3cm from nipple (palpable)     [2]   Past Surgical History:  Procedure Laterality Date    ANKLE Left     BREAST LUMPECTOMY Right 12/09/2024    Right partial mastectomy with sentinel lymph node biopsy    HYSTERECTOMY      TOTAL KNEE ARTHROPLASTY Right 01/2023   [3]   Social History  Tobacco Use    Smoking status: Every Day     Types: Cigarettes    Smokeless tobacco: Never   Vaping Use     Vaping status: Former   Substance Use Topics    Alcohol use: Yes     Comment: ocassionally    Drug use: Yes     Types: Marijuana     Comment: occassional   [4]   Family History  Problem Relation Name Age of Onset    Hypertension Mother      Diabetes Mother      Hypertension Father      Lung cancer Sister     [5]   Allergies  Allergen Reactions    Darvocet A500 [Propoxyphene N-Acetaminophen] Other        Roverto Holguin PA-C  04/18/25 5494

## 2025-05-05 ENCOUNTER — OFFICE VISIT (OUTPATIENT)
Dept: HEMATOLOGY/ONCOLOGY | Facility: CLINIC | Age: 70
End: 2025-05-05
Payer: COMMERCIAL

## 2025-05-05 VITALS
BODY MASS INDEX: 42.32 KG/M2 | RESPIRATION RATE: 17 BRPM | HEART RATE: 70 BPM | SYSTOLIC BLOOD PRESSURE: 145 MMHG | WEIGHT: 231.37 LBS | OXYGEN SATURATION: 97 % | DIASTOLIC BLOOD PRESSURE: 84 MMHG | TEMPERATURE: 97.5 F

## 2025-05-05 DIAGNOSIS — Z17.0 MALIGNANT NEOPLASM OF UPPER-OUTER QUADRANT OF RIGHT BREAST IN FEMALE, ESTROGEN RECEPTOR POSITIVE: ICD-10-CM

## 2025-05-05 DIAGNOSIS — C50.411 MALIGNANT NEOPLASM OF UPPER-OUTER QUADRANT OF RIGHT BREAST IN FEMALE, ESTROGEN RECEPTOR POSITIVE: ICD-10-CM

## 2025-05-05 PROCEDURE — 1159F MED LIST DOCD IN RCRD: CPT | Performed by: INTERNAL MEDICINE

## 2025-05-05 PROCEDURE — 99215 OFFICE O/P EST HI 40 MIN: CPT | Performed by: INTERNAL MEDICINE

## 2025-05-05 PROCEDURE — 1126F AMNT PAIN NOTED NONE PRSNT: CPT | Performed by: INTERNAL MEDICINE

## 2025-05-05 SDOH — ECONOMIC STABILITY: FOOD INSECURITY: WITHIN THE PAST 12 MONTHS, THE FOOD YOU BOUGHT JUST DIDN'T LAST AND YOU DIDN'T HAVE MONEY TO GET MORE.: NEVER TRUE

## 2025-05-05 SDOH — ECONOMIC STABILITY: FOOD INSECURITY: WITHIN THE PAST 12 MONTHS, YOU WORRIED THAT YOUR FOOD WOULD RUN OUT BEFORE YOU GOT THE MONEY TO BUY MORE.: NEVER TRUE

## 2025-05-05 ASSESSMENT — ENCOUNTER SYMPTOMS
CONSTITUTIONAL NEGATIVE: 1
GASTROINTESTINAL NEGATIVE: 1
CARDIOVASCULAR NEGATIVE: 1
RESPIRATORY NEGATIVE: 1

## 2025-05-05 ASSESSMENT — PAIN SCALES - GENERAL: PAINLEVEL_OUTOF10: 0-NO PAIN

## 2025-05-05 ASSESSMENT — PATIENT HEALTH QUESTIONNAIRE - PHQ9
1. LITTLE INTEREST OR PLEASURE IN DOING THINGS: NOT AT ALL
SUM OF ALL RESPONSES TO PHQ9 QUESTIONS 1 AND 2: 0
2. FEELING DOWN, DEPRESSED OR HOPELESS: NOT AT ALL

## 2025-05-05 NOTE — PROGRESS NOTES
Patient ID: Vira Gordon is a 69 y.o. female.  Referring Physician: Jelani Gregory MD  [id number] [id number]  Primary Care Provider: Aurora Delgadillo MD  Visit Type:  Follow Up     Subjective    HPI  T2N0 Oncotype : 10:   69 year old white female who presents for a right breast lumpectomy (partial mastectomy) and SLNB for right breast cancer. She originally presented with a palpable right breast mass. CNB showed IDC.  She had undergone a breast MRI since her last office appointment when she was diagnosed with hormone sensitive invasive ductal carcinoma.  She is here to review MRI results and discuss surgical intervention.  The MRI identified the mass at 3.1 cm with overlying skin dimpling, but no obvious skin involvement of the cancer.  Radiographically, all lymph nodes are negative.   Review of Systems   Constitutional: Negative.    HENT:  Negative.     Respiratory: Negative.     Cardiovascular: Negative.    Gastrointestinal: Negative.    Review of Systems   Constitutional: Negative.    HENT:  Negative.     Respiratory: Negative.     Cardiovascular: Negative.    Gastrointestinal: Negative.         Objective   BSA: There is no height or weight on file to calculate BSA.  There were no vitals taken for this visit.     has a past medical history of Hyperlipidemia and Mass of upper outer quadrant of right breast (10/15/2024).   has a past surgical history that includes Hysterectomy; Total knee arthroplasty (Right, 01/2023); XR ankle (Left); and Breast lumpectomy (Right, 12/09/2024).  Family History[1]  Oncology History   Malignant neoplasm of upper-outer quadrant of right breast in female, estrogen receptor positive   10/23/2024 Initial Diagnosis    Malignant neoplasm of upper-outer quadrant of right breast in female, estrogen receptor positive     12/9/2024 Cancer Staged    Staging form: Breast, AJCC 8th Edition, Pathologic stage from 12/9/2024: Stage IA (pT2, pN0, cM0, G2,  ER+, WY+, HER2-, Oncotype DX score: 10) - Signed by Benjamín MUNIZ MD on 4/2/2025         Vira Gordon  reports that she has been smoking cigarettes. She has never used smokeless tobacco.  She  reports current alcohol use.  She  reports current drug use. Drug: Marijuana.    Physical Exam  Constitutional:       Appearance: Normal appearance.   HENT:      Head: Normocephalic and atraumatic.   Eyes:      Extraocular Movements: Extraocular movements intact.      Pupils: Pupils are equal, round, and reactive to light.   Neurological:      Mental Status: She is alert.         WBC   Date/Time Value Ref Range Status   04/18/2025 05:08 PM 7.0 4.4 - 11.3 x10*3/uL Final   01/06/2025 12:00 PM 7.1 4.4 - 11.3 x10*3/uL Final     nRBC   Date Value Ref Range Status   04/18/2025 0.0 0.0 - 0.0 /100 WBCs Final   01/06/2025 0.0 0.0 - 0.0 /100 WBCs Final     RBC   Date Value Ref Range Status   04/18/2025 4.46 4.00 - 5.20 x10*6/uL Final   01/06/2025 4.73 4.00 - 5.20 x10*6/uL Final     Hemoglobin   Date Value Ref Range Status   04/18/2025 13.4 12.0 - 16.0 g/dL Final   01/06/2025 14.2 12.0 - 16.0 g/dL Final     Hematocrit   Date Value Ref Range Status   04/18/2025 41.8 36.0 - 46.0 % Final   01/06/2025 42.8 36.0 - 46.0 % Final     MCV   Date/Time Value Ref Range Status   04/18/2025 05:08 PM 94 80 - 100 fL Final   01/06/2025 12:00 PM 91 80 - 100 fL Final     MCH   Date/Time Value Ref Range Status   04/18/2025 05:08 PM 30.0 26.0 - 34.0 pg Final   01/06/2025 12:00 PM 30.0 26.0 - 34.0 pg Final     MCHC   Date/Time Value Ref Range Status   04/18/2025 05:08 PM 32.1 32.0 - 36.0 g/dL Final   01/06/2025 12:00 PM 33.2 32.0 - 36.0 g/dL Final     RDW   Date/Time Value Ref Range Status   04/18/2025 05:08 PM 12.8 11.5 - 14.5 % Final   01/06/2025 12:00 PM 12.7 11.5 - 14.5 % Final     Platelets   Date/Time Value Ref Range Status   04/18/2025 05:08  150 - 450 x10*3/uL Final   01/06/2025 12:00  150 - 450 x10*3/uL Final     No results found for:  "\"MPV\"  Neutrophils %   Date/Time Value Ref Range Status   04/18/2025 05:08 PM 57.2 40.0 - 80.0 % Final   01/06/2025 12:00 PM 63.1 40.0 - 80.0 % Final     Immature Granulocytes %, Automated   Date/Time Value Ref Range Status   04/18/2025 05:08 PM 0.3 0.0 - 0.9 % Final     Comment:     Immature Granulocyte Count (IG) includes promyelocytes, myelocytes and metamyelocytes but does not include bands. Percent differential counts (%) should be interpreted in the context of the absolute cell counts (cells/UL).   01/06/2025 12:00 PM 0.1 0.0 - 0.9 % Final     Comment:     Immature Granulocyte Count (IG) includes promyelocytes, myelocytes and metamyelocytes but does not include bands. Percent differential counts (%) should be interpreted in the context of the absolute cell counts (cells/UL).     Lymphocytes %   Date/Time Value Ref Range Status   04/18/2025 05:08 PM 28.1 13.0 - 44.0 % Final   01/06/2025 12:00 PM 26.3 13.0 - 44.0 % Final     Monocytes %   Date/Time Value Ref Range Status   04/18/2025 05:08 PM 7.7 2.0 - 10.0 % Final   01/06/2025 12:00 PM 6.3 2.0 - 10.0 % Final     Eosinophils %   Date/Time Value Ref Range Status   04/18/2025 05:08 PM 5.4 0.0 - 6.0 % Final   01/06/2025 12:00 PM 3.5 0.0 - 6.0 % Final     Basophils %   Date/Time Value Ref Range Status   04/18/2025 05:08 PM 1.3 0.0 - 2.0 % Final   01/06/2025 12:00 PM 0.7 0.0 - 2.0 % Final     Neutrophils Absolute   Date/Time Value Ref Range Status   04/18/2025 05:08 PM 3.99 1.20 - 7.70 x10*3/uL Final     Comment:     Percent differential counts (%) should be interpreted in the context of the absolute cell counts (cells/uL).   01/06/2025 12:00 PM 4.47 1.20 - 7.70 x10*3/uL Final     Comment:     Percent differential counts (%) should be interpreted in the context of the absolute cell counts (cells/uL).     Immature Granulocytes Absolute, Automated   Date/Time Value Ref Range Status   04/18/2025 05:08 PM 0.02 0.00 - 0.70 x10*3/uL Final   01/06/2025 12:00 PM 0.01 0.00 - " 0.70 x10*3/uL Final     Lymphocytes Absolute   Date/Time Value Ref Range Status   04/18/2025 05:08 PM 1.96 1.20 - 4.80 x10*3/uL Final   01/06/2025 12:00 PM 1.87 1.20 - 4.80 x10*3/uL Final     Monocytes Absolute   Date/Time Value Ref Range Status   04/18/2025 05:08 PM 0.54 0.10 - 1.00 x10*3/uL Final   01/06/2025 12:00 PM 0.45 0.10 - 1.00 x10*3/uL Final     Eosinophils Absolute   Date/Time Value Ref Range Status   04/18/2025 05:08 PM 0.38 0.00 - 0.70 x10*3/uL Final   01/06/2025 12:00 PM 0.25 0.00 - 0.70 x10*3/uL Final     Basophils Absolute   Date/Time Value Ref Range Status   04/18/2025 05:08 PM 0.09 0.00 - 0.10 x10*3/uL Final   01/06/2025 12:00 PM 0.05 0.00 - 0.10 x10*3/uL Final       Assessment/Plan    Early Stage BC: Oncoltype prediction low risk No /less than 1% on CTX:   NCCN recommemnds HB: Arimidex prescribed : 5 years:  Commence after XRT.     IMPRESSION:  DEXA:  According to World Health Organization criteria,  classification is osteoporosis. On Alendronate daily. RTC 3 months. Dr Thurman to continue ordering her MMG.    5/5/2025: Patient completed radiation treatments:stage IA pT2 pN0 cM0 right breast invasive ductal carcinoma (grade 2, ER+/LA+/HER2-, Oncotype DX 10) s/p right lumpectomy/SLNB on 12/9/24.     To continue 5 years of aromatase inhibitor therapy.  From 2/11/2025 to 3/3/2025 patient completed right breast fractionated radiation treatment under the management of Dr. Bernal.    Diagnoses and all orders for this visit:  Malignant neoplasm of upper-outer quadrant of right breast in female, estrogen receptor positive  -     Clinic Appointment Request Follow Up (assess anastrozole tolernace)  -     Clinic Appointment Request Follow Up; Future       Jelani Gregory MD                                [1]   Family History  Problem Relation Name Age of Onset    Hypertension Mother      Diabetes Mother      Hypertension Father      Lung cancer Sister

## 2025-05-05 NOTE — PATIENT INSTRUCTIONS
Today you met with your hematologist/oncologist.  Recent medications were discussed and questions answered.  Scheduling orders were placed.  While we appreciate that you verbalized understanding, if any questions arise after leaving, please do not hesitate to call the office to discuss.  [phone] Shellie Gregory will see you in one year. Please continue on your anastrozole.

## 2025-05-23 ENCOUNTER — HOSPITAL ENCOUNTER (OUTPATIENT)
Dept: RADIOLOGY | Facility: CLINIC | Age: 70
Discharge: HOME | End: 2025-05-23
Payer: COMMERCIAL

## 2025-05-23 DIAGNOSIS — M25.562 PAIN IN LEFT KNEE: ICD-10-CM

## 2025-05-23 PROCEDURE — 73562 X-RAY EXAM OF KNEE 3: CPT | Mod: LEFT SIDE | Performed by: RADIOLOGY

## 2025-05-23 PROCEDURE — 73562 X-RAY EXAM OF KNEE 3: CPT | Mod: LT

## 2025-06-24 ENCOUNTER — HOSPITAL ENCOUNTER (OUTPATIENT)
Dept: RADIOLOGY | Facility: HOSPITAL | Age: 70
Discharge: HOME | End: 2025-06-24
Payer: COMMERCIAL

## 2025-06-24 VITALS — WEIGHT: 231 LBS | BODY MASS INDEX: 42.51 KG/M2 | HEIGHT: 62 IN

## 2025-06-24 DIAGNOSIS — C50.411 MALIGNANT NEOPLASM OF UPPER-OUTER QUADRANT OF RIGHT BREAST IN FEMALE, ESTROGEN RECEPTOR POSITIVE: ICD-10-CM

## 2025-06-24 DIAGNOSIS — Z17.0 MALIGNANT NEOPLASM OF UPPER-OUTER QUADRANT OF RIGHT BREAST IN FEMALE, ESTROGEN RECEPTOR POSITIVE: ICD-10-CM

## 2025-06-24 PROCEDURE — 77065 DX MAMMO INCL CAD UNI: CPT | Mod: RIGHT SIDE

## 2025-06-24 PROCEDURE — 77061 BREAST TOMOSYNTHESIS UNI: CPT | Mod: RIGHT SIDE

## 2025-06-24 PROCEDURE — 77061 BREAST TOMOSYNTHESIS UNI: CPT | Mod: RT

## 2025-07-02 ENCOUNTER — APPOINTMENT (OUTPATIENT)
Dept: SURGERY | Facility: CLINIC | Age: 70
End: 2025-07-02
Payer: COMMERCIAL

## 2025-07-02 VITALS
BODY MASS INDEX: 43.06 KG/M2 | WEIGHT: 234 LBS | HEIGHT: 62 IN | DIASTOLIC BLOOD PRESSURE: 88 MMHG | HEART RATE: 56 BPM | SYSTOLIC BLOOD PRESSURE: 152 MMHG | OXYGEN SATURATION: 96 %

## 2025-07-02 DIAGNOSIS — C50.411 MALIGNANT NEOPLASM OF UPPER-OUTER QUADRANT OF RIGHT BREAST IN FEMALE, ESTROGEN RECEPTOR POSITIVE: Primary | ICD-10-CM

## 2025-07-02 DIAGNOSIS — Z12.31 ENCOUNTER FOR SCREENING MAMMOGRAM FOR BREAST CANCER: ICD-10-CM

## 2025-07-02 DIAGNOSIS — Z17.0 MALIGNANT NEOPLASM OF UPPER-OUTER QUADRANT OF RIGHT BREAST IN FEMALE, ESTROGEN RECEPTOR POSITIVE: Primary | ICD-10-CM

## 2025-07-02 PROCEDURE — 1160F RVW MEDS BY RX/DR IN RCRD: CPT | Performed by: SURGERY

## 2025-07-02 PROCEDURE — 99213 OFFICE O/P EST LOW 20 MIN: CPT | Performed by: SURGERY

## 2025-07-02 PROCEDURE — 3008F BODY MASS INDEX DOCD: CPT | Performed by: SURGERY

## 2025-07-02 PROCEDURE — 1159F MED LIST DOCD IN RCRD: CPT | Performed by: SURGERY

## 2025-07-02 PROCEDURE — G2211 COMPLEX E/M VISIT ADD ON: HCPCS | Performed by: SURGERY

## 2025-07-02 RX ORDER — LOSARTAN POTASSIUM 25 MG/1
25 TABLET ORAL
COMMUNITY
Start: 2025-05-29

## 2025-07-02 RX ORDER — ALENDRONATE SODIUM 10 MG/1
TABLET ORAL
COMMUNITY
Start: 2025-06-20

## 2025-07-02 ASSESSMENT — ENCOUNTER SYMPTOMS
HEADACHES: 0
ABDOMINAL PAIN: 0
FEVER: 0
WOUND: 0
SHORTNESS OF BREATH: 0
FATIGUE: 0
SPEECH DIFFICULTY: 0
EYE PAIN: 0
ARTHRALGIAS: 1
COUGH: 1
MYALGIAS: 0
FLANK PAIN: 0
DYSURIA: 0
WEAKNESS: 0
HEMATURIA: 0
FREQUENCY: 0
NAUSEA: 0
AGITATION: 0
DIARRHEA: 0
CONSTIPATION: 0
VOMITING: 0
EYE REDNESS: 0
CONFUSION: 0
CHILLS: 0
POLYPHAGIA: 0
BRUISES/BLEEDS EASILY: 0

## 2025-07-02 NOTE — PATIENT INSTRUCTIONS
Do your monthly self breast exam.  If you identify any abnormalities, please call Dr. Felix's office.  [phone]  Continue taking the anastrozole medication.  You should be on a calcium/vitamin D supplement while taking this medication.  Follow-up with medical oncology as scheduled.  The swelling of your right breast is due to the radiation.  You may apply lotion to the breast area and do massage therapy to help with the swelling.  You will be due for a mammogram of both breast in 6 months.  Follow-up in Dr. Felix's office after this mammogram.

## 2025-07-02 NOTE — PROGRESS NOTES
GENERAL SURGERY OFFICE NOTE    Patient: Vira Gordon    Age: 70 y.o.   Gender: female    MRN: [id number]    PCP: Aurora Delgadillo MD        SUBJECTIVE     Chief Complaint  Follow-up (Patient is here for a 5 month right breast follow up. Patient states that she gets pain in the right breast every once in awhile.)       JAVAD Constantino returns to the office for a 6-month follow up of right breast cancer s/p a right breast lumpectomy (palpable mass) and sentinel lymph node biopsy surgery.  In the last 6 months, she completed her adjuvant XRT.  She denies any complications during this.  She was started on Arimidex.  She is taking this daily, but is not on a calcium/vitamin D supplement.  She has chronic arthritis for which she takes meloxicam, but she does not feel that the Arimidex is making her arthritis any worse.  She denies any other side effects.  She has actually gained weight in the last 6 months.  She does her self breast exam on a almost daily basis, but has not identified any abnormalities.  Recent right diagnostic mammogram showed no radiographic concerns for malignancy.    Original risk factors for breast cancer: 69-year-old white female; menarche at age 14; first live birth at age 20; no previous breast biopsy; no family history of breast cancer.  No family history of ovarian/pancreatic/prostate cancer.  She has a sister who had tobacco related lung cancer.  She went through menopause around her mid 50s, never took any hormone replacement therapy.  This gives her a 5-year Antonia score of 1.2% and a lifetime risk of 4.2% which puts her in a less than average risk category.    ROS  Review of Systems   Constitutional:  Negative for chills, fatigue and fever.   HENT:  Negative for congestion, ear pain and hearing loss.    Eyes:  Negative for pain and redness.   Respiratory:  Positive for cough. Negative for shortness of breath.    Cardiovascular:  Negative for chest pain and leg swelling.   Gastrointestinal:   Negative for abdominal pain, constipation, diarrhea, nausea and vomiting.   Endocrine: Negative for polyphagia.   Genitourinary:  Negative for dysuria, flank pain, frequency and hematuria.   Musculoskeletal:  Positive for arthralgias. Negative for myalgias.   Skin:  Negative for rash and wound.   Allergic/Immunologic: Negative for immunocompromised state.   Neurological:  Negative for speech difficulty, weakness and headaches.   Hematological:  Does not bruise/bleed easily.   Psychiatric/Behavioral:  Negative for agitation and confusion.           HISTORY     Past Medical History:   Diagnosis Date    Breast cancer [id number]    Hyperlipidemia     Mass of upper outer quadrant of right breast 10/15/2024    12 o'clock; 3cm from nipple (palpable)      Personal history of irradiation 0125        Past Surgical History:   Procedure Laterality Date    ANKLE Left     BREAST BIOPSY  1124    BREAST LUMPECTOMY Right 12/09/2024    Right partial mastectomy with sentinel lymph node biopsy    HYSTERECTOMY      OOPHORECTOMY  30 years ago    TOTAL KNEE ARTHROPLASTY Right 01/2023        Family History   Problem Relation Name Age of Onset    Hypertension Mother      Diabetes Mother      Hypertension Father      Lung cancer Sister          Allergies   Allergen Reactions    Darvocet A500 [Propoxyphene N-Acetaminophen] Other        Social History     Tobacco Use   Smoking Status Every Day    Types: Cigarettes   Smokeless Tobacco Never        Social History     Substance and Sexual Activity   Alcohol Use Yes    Comment: ocassionally        HOME MEDICATIONS  Current Outpatient Medications   Medication Instructions    alendronate (Fosamax) 10 mg tablet     anastrozole (ARIMIDEX) 1 mg, oral, Daily, Swallow whole with a drink of water.    atorvastatin (Lipitor) 20 mg tablet 1 tablet, Daily    buPROPion XL (Wellbutrin XL) 150 mg 24 hr tablet 1 tablet, Every morning    celecoxib (CELEBREX) 100 mg, 2 times daily    losartan (COZAAR) 25 mg, Daily RT  "   meloxicam (MOBIC) 7.5 mg          OBJECTIVE   Last Recorded Vitals.  Blood pressure 152/88, pulse 56, height 1.575 m (5' 2\"), weight 106 kg (234 lb), SpO2 96%.     PHYSICAL EXAM  Physical Exam   Phone visit/previous exam:  General: Well-developed, well-nourished and in no acute distress.  Head: Normocephalic. Atraumatic.  Neck/thyroid: Neck is supple.   Eyes: Pupils equal round and reactive to light. Conjunctiva normal.  ENMT: No masses or deformity of external nose. External ears without masses.  Respiratory/Chest:  Normal respiratory effort.  Breast: Moderate to large, symmetrical breasts.  No palpable abnormality of the left breast.  On the right breast, just above the areola, the surgical incision is well-healed without any open wound.  There is centralized skin thickening and a small amount of edema consistent with XRT changes.  No palpable mass.  Lymphatics: No palpable lymphadenopathy of the cervical, supraclavicular or axillary regions.  Right axillary incision well-healed without any palpable abnormalities.  Cardiovascular: Regular rate and rhythm.   Abdomen: Soft, nontender, nondistended.   Musculoskeletal: Joints and limbs are grossly normal. Normal gait. Normal range of motion of major joints.  Neuro: Oriented to person, place and time. No obvious neurological deficit. Motor strength grossly normal.  Psych: Normal mood and affect.    RESULTS   Labs  Surgical Pathology Exam: [id number]  Order: [id number]   Collected 10/16/2024 12:12       Status: Final result       Visible to patient: No (inaccessible in [facility])    0 Result Notes       Component  Resulting Agency   FINAL DIAGNOSIS   A. 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     Note:  In this limited sample, the invasive carcinoma measures up to 7 mm in greatest dimension.  ER, WI and HER2 will be reported in a synoptic report.  "   Electronically signed by Rachid Lentz MD on 10/22/2024 at 1026      [facility]   By the signature on this report, the individual or group listed as making the Final Interpretation/Diagnosis certifies that they have reviewed this case.    Case Summary Report   Breast Biomarker Reporting Template   Protocol posted: 12/13/2023BREAST BIOMARKER REPORTING TEMPLATE - A  Test(s) Performed     Estrogen Receptor (ER) Status  Positive (greater than 10% of cells demonstrate nuclear positivity)   Percentage of Cells with Nuclear Positivity  >95 %   Average Intensity of Staining  Strong   Test Type  Food and Drug Administration (FDA) cleared (test / vendor): Roche CONFIRM anti-(ER) (SP1) Rabbit Monoclonal Primary Antibody   Primary Antibody  SP1   Scoring System  No separate scoring system used   Test(s) Performed     Progesterone Receptor (PgR) Status  Positive   Percentage of Cells with Nuclear Positivity  90 %   Average Intensity of Staining  Strong   Test Type  Food and Drug Administration (FDA) cleared (test / vendor): Roche CONFIRM anti-(CO) (1E2) Rabbit Monoclonal Primary Anitbody   Primary Antibody  1E2   Scoring System  No separate scoring system used   Test(s) Performed     HER2 by Immunohistochemistry  Negative (Score 0)   Test Type  Food and Drug Administration (FDA) cleared (test / vendor): Roche Pathway anti-HER-2/sabrina (4B5) Rabbit Monoclonal Primary Antibody             Tissue/Wound Culture/Smear  Order: [id number]   Collected 1/6/2025 13:15       Status: Preliminary result       Visible to patient: No (not released)    Specimen Information: Other (specify in comments); Tissue/Biopsy   0 Result Notes  Tissue/Wound Culture/Smear (4+) Abundant Methicillin Susceptible Staphylococcus aureus (MSSA) Abnormal    (4+) Abundant Mixed Aerobic and Anaerobic Bacteria            Gram Stain  Abnormal   (4+) Abundant Polymorphonuclear leukocytes   (4+) Abundant Mixed Gram positive and Gram negative bacteria           Resulting  Agency: [facility]     Susceptibility     Methicillin Susceptible Staphylococcus aureus (MSSA)     MICROSCAN    Clindamycin Susceptible    Erythromycin Resistant    Oxacillin Susceptible    Tetracycline Susceptible    Trimethoprim/Sulfamethoxazole Susceptible    Vancomycin Susceptible              Linear View        Specimen Collected: 01/06/25 13:15 Last Resulted: 01/08/25 12:23       Radiology Resutls  mammo right diagnostic tomosynthesis  Status: Final result     PACS Images     Show images for BI mammo right diagnostic tomosynthesis  Signed by    Signed Time Phone Pager   Brittny Noel MD 6/24/2025 15:47 [phone]     Exam Information    Status Exam Begun Exam Ended   Final 6/24/2025 14:11 6/24/2025 14:26     Study Result    Narrative & Impression   Interpreted By:  Brittny Noel,   STUDY:  BI MAMMO RIGHT DIAGNOSTIC TOMOSYNTHESIS;  6/24/2025 2:26 pm      ACCESSION NUMBER(S):  [id number]      ORDERING CLINICIAN:  JILLIAN SHEPPARD      INDICATION:          ,C50.411 Malignant neoplasm of upper-outer quadrant of right female  breast,Z17.0 Estrogen receptor positive status (ER+)      COMPARISON:  10/16/2024      FINDINGS:  MAMMOGRAPHY: 2D and tomosynthesis images were reviewed at 1 mm slice  thickness.      Density:  The breasts are almost entirely fatty.      Surgical scar is present superior anterior right breast with the  associated surgical clips. Skin thickness is increased compared to  the prior study related to interval treatment. Clips and scar are  noted in the low right axilla on the MLO view from lymph node biopsy.  Previous mass is no longer present. There is no new mass noted. No  malignant-appearing calcifications are identified.      IMPRESSION:  Treatment changes right breast with no findings suspicious for  malignancy      BI-RADS CATEGORY:  BI-RADS Category:  2 Benign.  Recommendation:  Annual Screening.  Recommended Date:  6 Months.  Laterality:  Bilateral.       PATHOLOGY  Surgical  Pathology Exam: [id number]  Order: [id number]   Collected 12/9/2024 11:29       Status: Final result       Visible to patient: No (inaccessible in [facility])       Dx: Malignant neoplasm of upper-outer sheila...    0 Result Notes       Component  Resulting Agency   FINAL DIAGNOSIS   A.  Right sentinel lymph node #1, biopsy:   -- One lymph node, negative for metastatic carcinoma (0/1)     B.  Right sentinel lymph node #2, biopsy:   -- One lymph node, negative for metastatic carcinoma (0/1)     C.  Right sentinel lymph node #3, biopsy:   -- One lymph node, negative for metastatic carcinoma (0/1)     D.  Right breast, lumpectomy:    -- Invasive ductal carcinoma, grade 2 (see synoptic report)   -- Ductal carcinoma in situ, intermediate nuclear grade  -- Biopsy site changes       Jake Zamudio MD, PhD        Electronically signed by Jake Zamudio MD PhD on 12/24/2024 at 1448      [facility]   By the signature on this report, the individual or group listed as making the Final Interpretation/Diagnosis certifies that they have reviewed this case.    Case Summary Report   INVASIVE CARCINOMA OF THE BREAST: Resection   8th Edition - Protocol posted: 6/19/2024INVASIVE CARCINOMA OF THE BREAST: RESECTION - All Specimens  SPECIMEN   Procedure  Excision (less than total mastectomy)   Specimen Laterality  Right   TUMOR   Histologic Type  Invasive carcinoma of no special type (ductal)   Histologic Grade (Silvia Histologic Score)     Glandular (Acinar) / Tubular Differentiation  Score 3   Nuclear Pleomorphism  Score 2   Mitotic Rate  Score 1   Overall Grade  Grade 2 (scores of 6 or 7)   Tumor Size  Greatest dimension of largest invasive focus (Millimeters): 38 mm mm   Tumor Focality  Single focus of invasive carcinoma   Ductal Carcinoma In Situ (DCIS)  Present     Negative for extensive intraductal component (EIC)   Size (Extent) of DCIS  Estimated size (extent) of DCIS is at least (Millimeters): 38 mm   Architectural Patterns  Cribriform      Solid   Nuclear Grade  Grade II (intermediate)   Necrosis  Present, focal (small foci or single cell necrosis)   Lymphatic and / or Vascular Invasion  Not identified   Dermal Lymphatic and / or Vascular Invasion  Not identified   Microcalcifications  Present in invasive carcinoma     Present in non-neoplastic tissue   Treatment Effect in the Breast  No known presurgical therapy   MARGINS   Margin Status for Invasive Carcinoma  All margins negative for invasive carcinoma   Distance from Invasive Carcinoma to Closest Margin  Greater than: 4 mm   Margin Status for DCIS  All margins negative for DCIS   Distance from DCIS to Closest Margin  Greater than: 4 mm   REGIONAL LYMPH NODES   Regional Lymph Node Status  All regional lymph nodes negative for tumor   Total Number of Lymph Nodes Examined (sentinel and non-sentinel)  3   Number of [place] Nodes Examined  3   pTNM CLASSIFICATION (AJCC 8th Edition)   Reporting of pT, pN, and (when applicable) pM categories is based on information available to the pathologist at the time the report is issued. As per the AJCC (Chapter 1, 8th Ed.) it is the managing physician's responsibility to establish the final pathologic stage based upon all pertinent information, including but potentially not limited to this pathology report.   pT Category  pT2   pN Category  pN0   N Suffix  (sn)             ASSESSMENT / PLAN   Diagnoses and all orders for this visit:  Malignant neoplasm of upper-outer quadrant of right breast in female, estrogen receptor positive  Encounter for screening mammogram for breast cancer  -     BI mammo bilateral screening tomosynthesis; Future          Plan  Oct 2024: RIGHT: palp 3.3cm mass at 12 o'clock, 3cm from nipple; IDC; gr2; >95/90/neg; s/p lumpectomy with SLNB; pT2 (3.8cm) N0 (3 neg SLNs) M0 with negative margins; adj XRT; Oncotype dx 10; started on Arimidex 2/11/25    1.  No clinical or radiographic concerns for recurrent cancer.  2.  There are some XRT  changes to the central aspect of the breast.  These do not seem to bother her.  She may do massage therapy to help with some of the skin thickening/edema.  3.  Her cancer is ER/GA positive.  Seen by Dr. Gregory (medical oncology) and started on anastrozole on 2/11/2025.  Did not require adjuvant chemotherapy as her Oncotype DX score was 10.  4.  She does not have any family history of breast cancer; therefore, does not meet criteria for referral to genetics.  5.  She will be due for a bilateral screening mammogram in 6 months.  Follow-up in the office after the mammogram.  6.  Reviewed that she needs to do her self breast exam, but only on a monthly basis.    Richa Felix MD, FACS  [facility] General Surgery  [address] [place] [address];   [address]  [place]  [address] [phone]

## 2026-01-02 ENCOUNTER — APPOINTMENT (OUTPATIENT)
Dept: SURGERY | Facility: CLINIC | Age: 71
End: 2026-01-02
Payer: COMMERCIAL

## (undated) DEVICE — Device

## (undated) DEVICE — APPLICATOR, CHLORAPREP, W/ORANGE TINT, 26ML

## (undated) DEVICE — SYRINGE, 10 CC, LUER LOCK

## (undated) DEVICE — GLOVE, PROTEXIS PI CLASSIC, SZ-7.0, PF, LF

## (undated) DEVICE — PAD, GROUNDING, ELECTROSURGICAL, W/9 FT CABLE, POLYHESIVE II, ADULT, LF

## (undated) DEVICE — SOLUTION, IRRIGATION, STERILE WATER, 1000 ML, POUR BOTTLE

## (undated) DEVICE — SPONGE, LAP, XRAY DECT, 18IN X 18IN, W/MASTER DMT, STERILE

## (undated) DEVICE — DRAPE, SHEET, ENDOSCOPY, GENERAL, FENESTRATED, ARMBOARD COVER, 98 X 123.5 IN, DISPOSABLE, LF, STERILE

## (undated) DEVICE — CLIP, LIGATING, HORIZON, MEDIUM, TITANIUM

## (undated) DEVICE — SUTURE, VICRYL, 4-0, 18 IN, UNDYED BR PS-2

## (undated) DEVICE — NEEDLE, HYPODERMIC, REGULAR WALL, REGULAR BEVEL, 22 G X 1.5 IN

## (undated) DEVICE — SUTURE, SILK, 2-0, 30 IN, SH, BLACK

## (undated) DEVICE — SUTURE, VICRYL, 2-0, 27 IN, SH, UNDYED

## (undated) DEVICE — DRESSING, GAUZE, SPONGE, VERSALON, ALL PURPOSE, 4 X 4 IN, SOFT

## (undated) DEVICE — TAPE, CLOTH, HYPOALLERGENIC, MEDIPORE HI SOFT, 6 IN X 10 YD

## (undated) DEVICE — SYRINGE, 5 CC, LUER LOCK

## (undated) DEVICE — COVER HANDLE LIGHT, STERIS, BLUE, STERILE

## (undated) DEVICE — KIT, MARGINMARKER, 6 INK COLORS, STANDARD

## (undated) DEVICE — STRAW, FILTER, 5 MIC, 1.7 IN